# Patient Record
Sex: FEMALE | Race: WHITE
[De-identification: names, ages, dates, MRNs, and addresses within clinical notes are randomized per-mention and may not be internally consistent; named-entity substitution may affect disease eponyms.]

---

## 2018-09-14 ENCOUNTER — HOSPITAL ENCOUNTER (EMERGENCY)
Dept: HOSPITAL 8 - ED | Age: 28
Discharge: HOME | End: 2018-09-14
Payer: MEDICAID

## 2018-09-14 VITALS — WEIGHT: 133.6 LBS | HEIGHT: 60 IN | BODY MASS INDEX: 26.23 KG/M2

## 2018-09-14 VITALS — SYSTOLIC BLOOD PRESSURE: 120 MMHG | DIASTOLIC BLOOD PRESSURE: 84 MMHG

## 2018-09-14 DIAGNOSIS — R19.7: ICD-10-CM

## 2018-09-14 DIAGNOSIS — R11.2: Primary | ICD-10-CM

## 2018-09-14 PROCEDURE — 99283 EMERGENCY DEPT VISIT LOW MDM: CPT

## 2018-10-12 ENCOUNTER — HOSPITAL ENCOUNTER (EMERGENCY)
Dept: HOSPITAL 8 - ED | Age: 28
Discharge: HOME | End: 2018-10-12
Payer: MEDICAID

## 2018-10-12 VITALS — DIASTOLIC BLOOD PRESSURE: 79 MMHG | SYSTOLIC BLOOD PRESSURE: 124 MMHG

## 2018-10-12 VITALS — BODY MASS INDEX: 25.54 KG/M2 | HEIGHT: 60 IN | WEIGHT: 130.07 LBS

## 2018-10-12 DIAGNOSIS — R30.0: ICD-10-CM

## 2018-10-12 DIAGNOSIS — N39.0: Primary | ICD-10-CM

## 2018-10-12 LAB
CULTURE INDICATED?: YES
HCG UR SG: 1.02 (ref 1–1.03)
MICROSCOPIC: (no result)

## 2018-10-12 PROCEDURE — 81001 URINALYSIS AUTO W/SCOPE: CPT

## 2018-10-12 PROCEDURE — 87077 CULTURE AEROBIC IDENTIFY: CPT

## 2018-10-12 PROCEDURE — 87147 CULTURE TYPE IMMUNOLOGIC: CPT

## 2018-10-12 PROCEDURE — 99284 EMERGENCY DEPT VISIT MOD MDM: CPT

## 2018-10-12 PROCEDURE — 81025 URINE PREGNANCY TEST: CPT

## 2018-10-12 PROCEDURE — 87186 SC STD MICRODIL/AGAR DIL: CPT

## 2018-10-12 PROCEDURE — 87086 URINE CULTURE/COLONY COUNT: CPT

## 2018-10-15 ENCOUNTER — HOSPITAL ENCOUNTER (EMERGENCY)
Dept: HOSPITAL 8 - ED | Age: 28
Discharge: HOME | End: 2018-10-15
Payer: MEDICAID

## 2018-10-15 VITALS — DIASTOLIC BLOOD PRESSURE: 65 MMHG | SYSTOLIC BLOOD PRESSURE: 116 MMHG

## 2018-10-15 VITALS — HEIGHT: 60 IN | WEIGHT: 129.63 LBS | BODY MASS INDEX: 25.45 KG/M2

## 2018-10-15 DIAGNOSIS — N13.2: Primary | ICD-10-CM

## 2018-10-15 DIAGNOSIS — F17.200: ICD-10-CM

## 2018-10-15 LAB
ALBUMIN SERPL-MCNC: 3.6 G/DL (ref 3.4–5)
ALP SERPL-CCNC: 95 U/L (ref 45–117)
ALT SERPL-CCNC: 22 U/L (ref 12–78)
ANION GAP SERPL CALC-SCNC: 9 MMOL/L (ref 5–15)
BASOPHILS # BLD AUTO: 0.08 X10^3/UL (ref 0–0.1)
BASOPHILS NFR BLD AUTO: 1 % (ref 0–1)
BILIRUB SERPL-MCNC: 0.2 MG/DL (ref 0.2–1)
CALCIUM SERPL-MCNC: 8.6 MG/DL (ref 8.5–10.1)
CHLORIDE SERPL-SCNC: 107 MMOL/L (ref 98–107)
CREAT SERPL-MCNC: 0.8 MG/DL (ref 0.55–1.02)
CULTURE INDICATED?: YES
EOSINOPHIL # BLD AUTO: 0.24 X10^3/UL (ref 0–0.4)
EOSINOPHIL NFR BLD AUTO: 2 % (ref 1–7)
ERYTHROCYTE [DISTWIDTH] IN BLOOD BY AUTOMATED COUNT: 14 % (ref 9.6–15.2)
LYMPHOCYTES # BLD AUTO: 2.67 X10^3/UL (ref 1–3.4)
LYMPHOCYTES NFR BLD AUTO: 24 % (ref 22–44)
MCH RBC QN AUTO: 29.8 PG (ref 27–34.8)
MCHC RBC AUTO-ENTMCNC: 34.1 G/DL (ref 32.4–35.8)
MCV RBC AUTO: 87.3 FL (ref 80–100)
MD: NO
MICROSCOPIC: (no result)
MONOCYTES # BLD AUTO: 1.03 X10^3/UL (ref 0.2–0.8)
MONOCYTES NFR BLD AUTO: 9 % (ref 2–9)
NEUTROPHILS # BLD AUTO: 7.33 X10^3/UL (ref 1.8–6.8)
NEUTROPHILS NFR BLD AUTO: 65 % (ref 42–75)
PLATELET # BLD AUTO: 498 X10^3/UL (ref 130–400)
PMV BLD AUTO: 7.3 FL (ref 7.4–10.4)
PROT SERPL-MCNC: 7.8 G/DL (ref 6.4–8.2)
RBC # BLD AUTO: 4.5 X10^6/UL (ref 3.82–5.3)

## 2018-10-15 PROCEDURE — 87086 URINE CULTURE/COLONY COUNT: CPT

## 2018-10-15 PROCEDURE — 99285 EMERGENCY DEPT VISIT HI MDM: CPT

## 2018-10-15 PROCEDURE — 36415 COLL VENOUS BLD VENIPUNCTURE: CPT

## 2018-10-15 PROCEDURE — 85025 COMPLETE CBC W/AUTO DIFF WBC: CPT

## 2018-10-15 PROCEDURE — 87077 CULTURE AEROBIC IDENTIFY: CPT

## 2018-10-15 PROCEDURE — 96372 THER/PROPH/DIAG INJ SC/IM: CPT

## 2018-10-15 PROCEDURE — 81001 URINALYSIS AUTO W/SCOPE: CPT

## 2018-10-15 PROCEDURE — 80053 COMPREHEN METABOLIC PANEL: CPT

## 2018-10-15 PROCEDURE — 74176 CT ABD & PELVIS W/O CONTRAST: CPT

## 2018-10-28 ENCOUNTER — HOSPITAL ENCOUNTER (EMERGENCY)
Dept: HOSPITAL 8 - ED | Age: 28
Discharge: HOME | End: 2018-10-28
Payer: MEDICAID

## 2018-10-28 VITALS — BODY MASS INDEX: 25.75 KG/M2 | HEIGHT: 60 IN | WEIGHT: 131.18 LBS

## 2018-10-28 VITALS — DIASTOLIC BLOOD PRESSURE: 78 MMHG | SYSTOLIC BLOOD PRESSURE: 136 MMHG

## 2018-10-28 DIAGNOSIS — N10: Primary | ICD-10-CM

## 2018-10-28 DIAGNOSIS — F17.200: ICD-10-CM

## 2018-10-28 LAB
ALBUMIN SERPL-MCNC: 3.6 G/DL (ref 3.4–5)
ANION GAP SERPL CALC-SCNC: 6 MMOL/L (ref 5–15)
BASOPHILS # BLD AUTO: 0.03 X10^3/UL (ref 0–0.1)
BASOPHILS NFR BLD AUTO: 0 % (ref 0–1)
CALCIUM SERPL-MCNC: 8.5 MG/DL (ref 8.5–10.1)
CHLORIDE SERPL-SCNC: 98 MMOL/L (ref 98–107)
CREAT SERPL-MCNC: 0.77 MG/DL (ref 0.55–1.02)
CULTURE INDICATED?: YES
EOSINOPHIL # BLD AUTO: 0.05 X10^3/UL (ref 0–0.4)
EOSINOPHIL NFR BLD AUTO: 1 % (ref 1–7)
ERYTHROCYTE [DISTWIDTH] IN BLOOD BY AUTOMATED COUNT: 14.4 % (ref 9.6–15.2)
HCG UR SG: 1.01 (ref 1–1.03)
LYMPHOCYTES # BLD AUTO: 1.11 X10^3/UL (ref 1–3.4)
LYMPHOCYTES NFR BLD AUTO: 11 % (ref 22–44)
MCH RBC QN AUTO: 29.5 PG (ref 27–34.8)
MCHC RBC AUTO-ENTMCNC: 33.3 G/DL (ref 32.4–35.8)
MCV RBC AUTO: 88.5 FL (ref 80–100)
MD: NO
MICROSCOPIC: (no result)
MONOCYTES # BLD AUTO: 0.68 X10^3/UL (ref 0.2–0.8)
MONOCYTES NFR BLD AUTO: 7 % (ref 2–9)
NEUTROPHILS # BLD AUTO: 8.67 X10^3/UL (ref 1.8–6.8)
NEUTROPHILS NFR BLD AUTO: 82 % (ref 42–75)
PLATELET # BLD AUTO: 411 X10^3/UL (ref 130–400)
PMV BLD AUTO: 7.1 FL (ref 7.4–10.4)
RBC # BLD AUTO: 4.52 X10^6/UL (ref 3.82–5.3)

## 2018-10-28 PROCEDURE — 93005 ELECTROCARDIOGRAM TRACING: CPT

## 2018-10-28 PROCEDURE — 81025 URINE PREGNANCY TEST: CPT

## 2018-10-28 PROCEDURE — 87077 CULTURE AEROBIC IDENTIFY: CPT

## 2018-10-28 PROCEDURE — 87040 BLOOD CULTURE FOR BACTERIA: CPT

## 2018-10-28 PROCEDURE — 85025 COMPLETE CBC W/AUTO DIFF WBC: CPT

## 2018-10-28 PROCEDURE — 82040 ASSAY OF SERUM ALBUMIN: CPT

## 2018-10-28 PROCEDURE — 99285 EMERGENCY DEPT VISIT HI MDM: CPT

## 2018-10-28 PROCEDURE — 36415 COLL VENOUS BLD VENIPUNCTURE: CPT

## 2018-10-28 PROCEDURE — 80048 BASIC METABOLIC PNL TOTAL CA: CPT

## 2018-10-28 PROCEDURE — 96365 THER/PROPH/DIAG IV INF INIT: CPT

## 2018-10-28 PROCEDURE — 83605 ASSAY OF LACTIC ACID: CPT

## 2018-10-28 PROCEDURE — 87186 SC STD MICRODIL/AGAR DIL: CPT

## 2018-10-28 PROCEDURE — 76770 US EXAM ABDO BACK WALL COMP: CPT

## 2018-10-28 PROCEDURE — 87086 URINE CULTURE/COLONY COUNT: CPT

## 2018-10-28 PROCEDURE — 81001 URINALYSIS AUTO W/SCOPE: CPT

## 2018-10-28 PROCEDURE — 96375 TX/PRO/DX INJ NEW DRUG ADDON: CPT

## 2018-10-31 ENCOUNTER — APPOINTMENT (OUTPATIENT)
Dept: RADIOLOGY | Facility: MEDICAL CENTER | Age: 28
End: 2018-10-31
Attending: EMERGENCY MEDICINE

## 2018-10-31 ENCOUNTER — HOSPITAL ENCOUNTER (EMERGENCY)
Facility: MEDICAL CENTER | Age: 28
End: 2018-10-31
Attending: EMERGENCY MEDICINE

## 2018-10-31 VITALS
DIASTOLIC BLOOD PRESSURE: 78 MMHG | SYSTOLIC BLOOD PRESSURE: 118 MMHG | HEART RATE: 103 BPM | WEIGHT: 132.06 LBS | HEIGHT: 60 IN | RESPIRATION RATE: 18 BRPM | OXYGEN SATURATION: 98 % | TEMPERATURE: 96.9 F | BODY MASS INDEX: 25.93 KG/M2

## 2018-10-31 DIAGNOSIS — F41.8 SITUATIONAL ANXIETY: ICD-10-CM

## 2018-10-31 DIAGNOSIS — N12 PYELONEPHRITIS: ICD-10-CM

## 2018-10-31 DIAGNOSIS — F15.10 METHAMPHETAMINE ABUSE (HCC): ICD-10-CM

## 2018-10-31 LAB
AMPHET UR QL SCN: POSITIVE
ANION GAP SERPL CALC-SCNC: 9 MMOL/L (ref 0–11.9)
APPEARANCE UR: ABNORMAL
BACTERIA #/AREA URNS HPF: ABNORMAL /HPF
BARBITURATES UR QL SCN: NEGATIVE
BASOPHILS # BLD AUTO: 0.5 % (ref 0–1.8)
BASOPHILS # BLD: 0.05 K/UL (ref 0–0.12)
BENZODIAZ UR QL SCN: NEGATIVE
BILIRUB UR QL STRIP.AUTO: NEGATIVE
BUN SERPL-MCNC: 7 MG/DL (ref 8–22)
BZE UR QL SCN: NEGATIVE
CALCIUM SERPL-MCNC: 9.8 MG/DL (ref 8.5–10.5)
CANNABINOIDS UR QL SCN: POSITIVE
CHLORIDE SERPL-SCNC: 101 MMOL/L (ref 96–112)
CO2 SERPL-SCNC: 25 MMOL/L (ref 20–33)
COLOR UR: YELLOW
CREAT SERPL-MCNC: 0.59 MG/DL (ref 0.5–1.4)
EKG IMPRESSION: NORMAL
EOSINOPHIL # BLD AUTO: 0.17 K/UL (ref 0–0.51)
EOSINOPHIL NFR BLD: 1.6 % (ref 0–6.9)
EPI CELLS #/AREA URNS HPF: ABNORMAL /HPF
ERYTHROCYTE [DISTWIDTH] IN BLOOD BY AUTOMATED COUNT: 43.4 FL (ref 35.9–50)
GLUCOSE SERPL-MCNC: 98 MG/DL (ref 65–99)
GLUCOSE UR STRIP.AUTO-MCNC: NEGATIVE MG/DL
HCG UR QL: NEGATIVE
HCT VFR BLD AUTO: 36.4 % (ref 37–47)
HGB BLD-MCNC: 12.4 G/DL (ref 12–16)
HYALINE CASTS #/AREA URNS LPF: ABNORMAL /LPF
IMM GRANULOCYTES # BLD AUTO: 0.04 K/UL (ref 0–0.11)
IMM GRANULOCYTES NFR BLD AUTO: 0.4 % (ref 0–0.9)
KETONES UR STRIP.AUTO-MCNC: NEGATIVE MG/DL
LEUKOCYTE ESTERASE UR QL STRIP.AUTO: ABNORMAL
LYMPHOCYTES # BLD AUTO: 2.78 K/UL (ref 1–4.8)
LYMPHOCYTES NFR BLD: 26.2 % (ref 22–41)
MCH RBC QN AUTO: 29.4 PG (ref 27–33)
MCHC RBC AUTO-ENTMCNC: 34.1 G/DL (ref 33.6–35)
MCV RBC AUTO: 86.3 FL (ref 81.4–97.8)
METHADONE UR QL SCN: NEGATIVE
MICRO URNS: ABNORMAL
MONOCYTES # BLD AUTO: 1.34 K/UL (ref 0–0.85)
MONOCYTES NFR BLD AUTO: 12.6 % (ref 0–13.4)
NEUTROPHILS # BLD AUTO: 6.23 K/UL (ref 2–7.15)
NEUTROPHILS NFR BLD: 58.7 % (ref 44–72)
NITRITE UR QL STRIP.AUTO: NEGATIVE
NRBC # BLD AUTO: 0 K/UL
NRBC BLD-RTO: 0 /100 WBC
OPIATES UR QL SCN: NEGATIVE
OXYCODONE UR QL SCN: NEGATIVE
PCP UR QL SCN: NEGATIVE
PH UR STRIP.AUTO: 6 [PH]
PLATELET # BLD AUTO: 389 K/UL (ref 164–446)
PMV BLD AUTO: 9.2 FL (ref 9–12.9)
POTASSIUM SERPL-SCNC: 3.5 MMOL/L (ref 3.6–5.5)
PROPOXYPH UR QL SCN: NEGATIVE
PROT UR QL STRIP: NEGATIVE MG/DL
RBC # BLD AUTO: 4.22 M/UL (ref 4.2–5.4)
RBC # URNS HPF: ABNORMAL /HPF
RBC UR QL AUTO: ABNORMAL
SODIUM SERPL-SCNC: 135 MMOL/L (ref 135–145)
SP GR UR REFRACTOMETRY: 1.02
SP GR UR STRIP.AUTO: 1.02
UROBILINOGEN UR STRIP.AUTO-MCNC: 1 MG/DL
WBC # BLD AUTO: 10.6 K/UL (ref 4.8–10.8)
WBC #/AREA URNS HPF: ABNORMAL /HPF

## 2018-10-31 PROCEDURE — 700105 HCHG RX REV CODE 258: Performed by: EMERGENCY MEDICINE

## 2018-10-31 PROCEDURE — 96375 TX/PRO/DX INJ NEW DRUG ADDON: CPT

## 2018-10-31 PROCEDURE — 99284 EMERGENCY DEPT VISIT MOD MDM: CPT

## 2018-10-31 PROCEDURE — 36415 COLL VENOUS BLD VENIPUNCTURE: CPT

## 2018-10-31 PROCEDURE — 700102 HCHG RX REV CODE 250 W/ 637 OVERRIDE(OP): Performed by: EMERGENCY MEDICINE

## 2018-10-31 PROCEDURE — A9270 NON-COVERED ITEM OR SERVICE: HCPCS | Performed by: EMERGENCY MEDICINE

## 2018-10-31 PROCEDURE — 76775 US EXAM ABDO BACK WALL LIM: CPT

## 2018-10-31 PROCEDURE — 85025 COMPLETE CBC W/AUTO DIFF WBC: CPT

## 2018-10-31 PROCEDURE — 81001 URINALYSIS AUTO W/SCOPE: CPT

## 2018-10-31 PROCEDURE — 80048 BASIC METABOLIC PNL TOTAL CA: CPT

## 2018-10-31 PROCEDURE — 93005 ELECTROCARDIOGRAM TRACING: CPT

## 2018-10-31 PROCEDURE — 700111 HCHG RX REV CODE 636 W/ 250 OVERRIDE (IP): Performed by: EMERGENCY MEDICINE

## 2018-10-31 PROCEDURE — 80307 DRUG TEST PRSMV CHEM ANLYZR: CPT

## 2018-10-31 PROCEDURE — 93005 ELECTROCARDIOGRAM TRACING: CPT | Performed by: EMERGENCY MEDICINE

## 2018-10-31 PROCEDURE — 81025 URINE PREGNANCY TEST: CPT

## 2018-10-31 PROCEDURE — 96365 THER/PROPH/DIAG IV INF INIT: CPT

## 2018-10-31 RX ORDER — SULFAMETHOXAZOLE AND TRIMETHOPRIM 800; 160 MG/1; MG/1
1 TABLET ORAL ONCE
Status: COMPLETED | OUTPATIENT
Start: 2018-10-31 | End: 2018-10-31

## 2018-10-31 RX ORDER — SODIUM CHLORIDE 9 MG/ML
1000 INJECTION, SOLUTION INTRAVENOUS ONCE
Status: COMPLETED | OUTPATIENT
Start: 2018-10-31 | End: 2018-10-31

## 2018-10-31 RX ORDER — KETOROLAC TROMETHAMINE 30 MG/ML
10 INJECTION, SOLUTION INTRAMUSCULAR; INTRAVENOUS ONCE
Status: COMPLETED | OUTPATIENT
Start: 2018-10-31 | End: 2018-10-31

## 2018-10-31 RX ORDER — SULFAMETHOXAZOLE AND TRIMETHOPRIM 800; 160 MG/1; MG/1
1 TABLET ORAL 2 TIMES DAILY
Qty: 20 TAB | Refills: 0 | Status: SHIPPED | OUTPATIENT
Start: 2018-10-31 | End: 2018-11-10

## 2018-10-31 RX ADMIN — SULFAMETHOXAZOLE AND TRIMETHOPRIM 1 TABLET: 800; 160 TABLET ORAL at 04:15

## 2018-10-31 RX ADMIN — KETOROLAC TROMETHAMINE 9.99 MG: 30 INJECTION, SOLUTION INTRAMUSCULAR at 02:52

## 2018-10-31 RX ADMIN — SODIUM CHLORIDE 1000 ML: 9 INJECTION, SOLUTION INTRAVENOUS at 02:52

## 2018-10-31 RX ADMIN — FENTANYL CITRATE 50 MCG: 50 INJECTION, SOLUTION INTRAMUSCULAR; INTRAVENOUS at 02:52

## 2018-10-31 RX ADMIN — CEFTRIAXONE SODIUM 1 G: 1 INJECTION, POWDER, FOR SOLUTION INTRAMUSCULAR; INTRAVENOUS at 04:15

## 2018-10-31 ASSESSMENT — PAIN SCALES - GENERAL
PAINLEVEL_OUTOF10: 8
PAINLEVEL_OUTOF10: 3
PAINLEVEL_OUTOF10: 7

## 2018-10-31 NOTE — ED TRIAGE NOTES
"Zita Mejia  28 y.o.  Chief Complaint   Patient presents with   • UTI     seen at [facility] for the same yesterday, given antibiotics/fluids and discharged   • Flank Pain     RIGHT     Ambulatory to triage with steady gait for above.    States that in the past month she has been treated for a UTI, kidney stone, and kidney infection, all in that order and all at [facility]. Each time patient has taken medication as prescribed at home. States that she took antibiotics yesterday and today as prescribed by [facility] but still is not feeling better. \"I keep going there and my symptoms aren't going away so I figured I would come here instead and see if you guys could help me.\"    EKG called for again in triage per protocol.    Specimen container provided to patient and instructed on clean catch urine sample - verbalized understanding.    Triage process explained to patient, apologized for wait time, and returned to lobby.  "

## 2018-10-31 NOTE — ED NOTES
Assist RN: IV access placed and blood drawn per orders and sent to lab. Patient tolerated fairly well with minimal complaints of pain/discomfort.

## 2018-10-31 NOTE — ED PROVIDER NOTES
"ED Provider Note    Scribed for Jas Small M.D. by Jas Small. 10/31/2018,  1:22 AM.    CHIEF COMPLAINT  Chief Complaint   Patient presents with   • UTI     seen at [facility] for the same yesterday, given antibiotics/fluids and discharged   • Flank Pain     RIGHT       HPI  Zita Mejia is a pleasant but very anxious 28 y.o. female who presents to the Emergency Department for right-sided flank pain, and dysuria.  She reports that in the past month, she was treated for a simple urinary tract infection, then a kidney stone, and then a kidney infection, with all of these diagnoses being made at [facility].  She was seen yesterday for right-sided flank pain and urinary tract infection.  She says that she started the antibiotics that she was given yesterday, took them today, but is continuing to have symptoms.  She states \"I keep going there and my symptoms aren't going away so I figured I would come here instead and see if you guys could help me.\"  She reports subjective fevers and chills, relatively constant right flank pain, no nausea or vomiting, no chest pain or shortness of breath.  She thinks her urine is dark looking, but not bloody.  She says that at [facility] yesterday they had a hard time getting her heart rate down.  She says that she received 2 L of IV fluid there.  She does not have a known history of kidney stones, or frequent infections.  She says she takes no medications on a normal day.  She is a pack-a-day smoker.  She says that the kidney stone was diagnosed with a CT scan.  She says that an ultrasound was repeated yesterday but does not know the results.     An EKG was performed in triage because of a presenting heart rate of 130 bpm.      REVIEW OF SYSTEMS  See HPI for further details. All other systems are negative.     PAST MEDICAL HISTORY   has a past medical history of Kidney stone; Marijuana use; Pyelonephritis; Smoker; and UTI (urinary tract infection).    SOCIAL " HISTORY  Social History     Social History Main Topics   • Smoking status: Current Every Day Smoker     Packs/day: 1.00     Types: Cigarettes   • Smokeless tobacco: Never Used      Comment: 15    • Alcohol use 0.5 oz/week     1 Cans of beer per week   • Drug use: Yes     Types: Marijuana, Inhaled      Comment: marijuana daily   • Sexual activity: Not on file     History   Drug Use   • Types: Marijuana, Inhaled     Comment: marijuana daily       SURGICAL HISTORY  patient denies any surgical history    CURRENT MEDICATIONS  Home Medications     Reviewed by Millie Arellano R.N. (Registered Nurse) on 10/31/18 at 0049  Med List Status: Unable to Obtain   Medication Last Dose Status        Patient Pipe Taking any Medications                       ALLERGIES  Allergies   Allergen Reactions   • Azithromycin    • Ibuprofen        PHYSICAL EXAM  VITAL SIGNS: /78   Pulse (!) 103   Temp 36.1 °C (96.9 °F)   Resp 18   Ht 1.524 m (5')   Wt 59.9 kg (132 lb 0.9 oz)   SpO2 98%   BMI 25.79 kg/m²    Pulse ox interpretation: I interpret this pulse ox as normal.  Constitutional: Alert in no apparent distress.  Very anxious, rapid, pressured speech.  HENT: No signs of trauma, Bilateral external ears normal, Nose normal.   Eyes: Conjunctiva normal, Non-icteric.   Neck: Normal range of motion, Supple, No stridor.   Lymphatic: No lymphadenopathy noted.   Cardiovascular: Regular rate and rhythm, no murmurs.   Thorax & Lungs: Normal breath sounds, No respiratory distress, No wheezing, No chest tenderness.   Abdomen: Bowel sounds normal, Soft, No tenderness, No masses, No pulsatile masses. No peritoneal signs.  Skin: Warm, Dry, No erythema, No rash.   Back: Moderate right-sided CVA tenderness  Extremities: Intact distal pulses, No edema, No cyanosis.  Musculoskeletal: Good range of motion in all major joints. No or major deformities noted.   Neurologic: Alert , Normal motor function, Normal sensory function, No focal deficits noted.    Psychiatric: Affect anxious, judgment normal, Mood normal.     DIAGNOSTIC STUDIES / PROCEDURES    EKG  Interpreted by me    Rhythm:  Sinus tachycardia  Rate: 122  Axis: normal  Intervals: normal  Ectopy: none  Conduction: normal  ST Segments: no acute change  T Waves: no acute change  Q Waves: none  No Old EKG.    LABS  Labs Reviewed   CBC WITH DIFFERENTIAL - Abnormal; Notable for the following:        Result Value    Hematocrit 36.4 (*)     Monos (Absolute) 1.34 (*)     All other components within normal limits   BASIC METABOLIC PANEL - Abnormal; Notable for the following:     Potassium 3.5 (*)     Bun 7 (*)     All other components within normal limits   URINALYSIS,CULTURE IF INDICATED - Abnormal; Notable for the following:     Character Cloudy (*)     Leukocyte Esterase Large (*)     Occult Blood Trace (*)     All other components within normal limits   URINE DRUG SCREEN - Abnormal; Notable for the following:     Amphetamines Urine Positive (*)     Cannabinoid Metab Positive (*)     All other components within normal limits   URINE MICROSCOPIC (W/UA) - Abnormal; Notable for the following:     WBC  (*)     RBC 5-10 (*)     Bacteria Moderate (*)     Epithelial Cells Many (*)     Hyaline Cast 3-5 (*)     All other components within normal limits   HCG QUALITATIVE UR   REFRACTOMETER SG   ESTIMATED GFR     All labs reviewed by me.    RADIOLOGY  US-RENAL   Final Result         1.  Normal renal ultrasound.        The radiologist's interpretation of all radiological studies have been reviewed by me.    COURSE & MEDICAL DECISION MAKING  Nursing notes, VS, PMSFHx reviewed in chart.     1:22 AM Patient seen and examined at bedside. Differential diagnosis includes but is not limited to pyelonephritis, renal colic, ureteral stone, hydronephrosis, dehydration, pregnancy, ectopic pregnancy, anxiety. Ordered for renal ultrasound, urinalysis, urine pregnancy test, urine drug screen, and screening blood tests to evaluate.  Patient will be treated with Toradol and fentanyl and a liter of IV fluid for her symptoms.     HYDRATION: Based on the patient's presentation of Tachycardia the patient was given IV fluids. IV Hydration was used becasue oral hydration was not as rapid as required. Upon recheck following hydration, the patient was improved, with a lower heart rate. .    This patient's heart rate returned nearly to normal with IV fluids.  Some of her tachycardia is likely secondary to methamphetamine abuse, as evidenced by her laboratory results, consistent with a very pressured speech and apparent anxiety.  Her laboratory tests were very reassuring.  There is no evidence of any systemic illness on her laboratory tests.  She does still have evidence of urinary tract infection and right CVA tenderness.  Fortunately, her ultrasound did not show any evidence of kidney stone or hydronephrosis.  She will be discharged in improved condition with a prescription for twice daily Bactrim, after a gram of i.v. ceftriaxone here.     The patient will return for new or worsening symptoms and is stable at the time of discharge.    The patient is referred to a primary physician for blood pressure management, diabetic screening, and for all other preventative health concerns.    DISPOSITION:  Patient will be discharged home in stable condition.    FOLLOW UP:  [facility]  [address] [address]  [phone]  Schedule an appointment as soon as possible for a visit   for primary care    [facility]  [address]  [place] [address]  [phone]    Schedule an appointment as soon as possible for a visit   for primary care    [facility]  [address] [place] [address] [address]  [facility] [address]  Schedule an appointment as soon as possible for a visit   for primary care      OUTPATIENT MEDICATIONS:  Discharge Medication List as of 10/31/2018  4:50 AM      START taking these medications    Details   sulfamethoxazole-trimethoprim (BACTRIM  DS) 800-160 MG tablet Take 1 Tab by mouth 2 times a day for 10 days., Disp-20 Tab, R-0, Print Rx Paper             FINAL IMPRESSION  1. Pyelonephritis Active   2. Methamphetamine abuse (HCC) Active   3. Situational anxiety Active

## 2018-10-31 NOTE — ED NOTES
Is currently being treated for kidney infections and is taking atb's as described.   Pt c/o of right flank pain and urinary pain.

## 2018-10-31 NOTE — ED NOTES
All lines and monitors discontinued. Discharge instructions given, questions answered.    ambulatroy out of ER, escorted by self.  Instructed not to drive after taking pain medication and pt verbalizes understanding.  Rx x bactrim given.

## 2018-10-31 NOTE — DISCHARGE INSTRUCTIONS
Your blood tests were very reassuring and your ultrasound was normal. We did not see kidney stones. Use the antibiotics we've prescribed, as directed. Use the clinic information we provided to establish a primary care doctor.

## 2019-02-24 ENCOUNTER — HOSPITAL ENCOUNTER (EMERGENCY)
Dept: HOSPITAL 8 - ED | Age: 29
Discharge: HOME | End: 2019-02-24
Payer: MEDICAID

## 2019-02-24 VITALS — HEIGHT: 60 IN | WEIGHT: 150.13 LBS | BODY MASS INDEX: 29.48 KG/M2

## 2019-02-24 VITALS — SYSTOLIC BLOOD PRESSURE: 128 MMHG | DIASTOLIC BLOOD PRESSURE: 80 MMHG

## 2019-02-24 DIAGNOSIS — H92.03: ICD-10-CM

## 2019-02-24 DIAGNOSIS — B34.9: Primary | ICD-10-CM

## 2019-02-24 DIAGNOSIS — F17.200: ICD-10-CM

## 2019-02-24 PROCEDURE — 99283 EMERGENCY DEPT VISIT LOW MDM: CPT

## 2019-02-24 PROCEDURE — 71046 X-RAY EXAM CHEST 2 VIEWS: CPT

## 2019-12-23 ENCOUNTER — HOSPITAL ENCOUNTER (EMERGENCY)
Dept: HOSPITAL 8 - ED | Age: 29
LOS: 1 days | Discharge: HOME | End: 2019-12-24
Payer: SELF-PAY

## 2019-12-23 VITALS — BODY MASS INDEX: 25.93 KG/M2 | WEIGHT: 132.06 LBS | HEIGHT: 60 IN

## 2019-12-23 DIAGNOSIS — Z86.19: ICD-10-CM

## 2019-12-23 DIAGNOSIS — A59.01: Primary | ICD-10-CM

## 2019-12-23 DIAGNOSIS — F17.200: ICD-10-CM

## 2019-12-23 DIAGNOSIS — Z87.440: ICD-10-CM

## 2019-12-23 PROCEDURE — 81025 URINE PREGNANCY TEST: CPT

## 2019-12-23 PROCEDURE — 96372 THER/PROPH/DIAG INJ SC/IM: CPT

## 2019-12-23 PROCEDURE — 87591 N.GONORRHOEAE DNA AMP PROB: CPT

## 2019-12-23 PROCEDURE — 87808 TRICHOMONAS ASSAY W/OPTIC: CPT

## 2019-12-23 PROCEDURE — 87491 CHLMYD TRACH DNA AMP PROBE: CPT

## 2019-12-23 PROCEDURE — 87210 SMEAR WET MOUNT SALINE/INK: CPT

## 2019-12-23 PROCEDURE — 87086 URINE CULTURE/COLONY COUNT: CPT

## 2019-12-23 PROCEDURE — 99283 EMERGENCY DEPT VISIT LOW MDM: CPT

## 2019-12-23 PROCEDURE — 81001 URINALYSIS AUTO W/SCOPE: CPT

## 2019-12-24 VITALS — SYSTOLIC BLOOD PRESSURE: 125 MMHG | DIASTOLIC BLOOD PRESSURE: 74 MMHG

## 2019-12-24 LAB
CLUE CELLS: (no result)
CULTURE INDICATED?: YES
HCG UR SG: 1.02 (ref 1–1.03)
MICROSCOPIC: (no result)
T VAGINALIS RRNA GENITAL QL PROBE: PRESENT
WET PREP WBCS: (no result)

## 2020-06-05 ENCOUNTER — HOSPITAL ENCOUNTER (EMERGENCY)
Dept: HOSPITAL 8 - ED | Age: 30
Discharge: HOME | End: 2020-06-05
Payer: MEDICAID

## 2020-06-05 VITALS — DIASTOLIC BLOOD PRESSURE: 88 MMHG | SYSTOLIC BLOOD PRESSURE: 147 MMHG

## 2020-06-05 VITALS — WEIGHT: 139.33 LBS | HEIGHT: 60 IN | BODY MASS INDEX: 27.35 KG/M2

## 2020-06-05 DIAGNOSIS — S02.2XXA: Primary | ICD-10-CM
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
## 2020-06-05 DIAGNOSIS — R04.0: ICD-10-CM
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
## 2020-06-05 PROCEDURE — 30901 CONTROL OF NOSEBLEED: CPT

## 2020-06-05 PROCEDURE — 99284 EMERGENCY DEPT VISIT MOD MDM: CPT

## 2020-06-05 NOTE — NUR
Mason CALVILLO at bedside to recheck pt. Epistaxis appears controlled at this time. 
POC discussed with pt. Pt to be monitored in ER to ensure bleeding does not 
restart.

## 2020-06-05 NOTE — NUR
Pt medicated per MAR. After going over pt's dc instructions pt's R anthony began 
dripping blood. PA updated. PA at bedside to perform chemical cautery.

## 2020-06-05 NOTE — NUR
Pt resting in exam chair, looking at her phone, NADN. Gauze in R nare and nasal 
clamp in place. POC discussed. Pt to be rechecked by PA. Pt denies other needs.

## 2021-01-12 ENCOUNTER — HOSPITAL ENCOUNTER (EMERGENCY)
Dept: HOSPITAL 8 - ED | Age: 31
End: 2021-01-12
Payer: SELF-PAY

## 2021-01-12 VITALS — HEIGHT: 60 IN | WEIGHT: 132.5 LBS | BODY MASS INDEX: 26.01 KG/M2

## 2021-01-12 VITALS — SYSTOLIC BLOOD PRESSURE: 137 MMHG | DIASTOLIC BLOOD PRESSURE: 81 MMHG

## 2021-01-12 DIAGNOSIS — F15.10: ICD-10-CM

## 2021-01-12 DIAGNOSIS — F17.200: ICD-10-CM

## 2021-01-12 DIAGNOSIS — L03.113: Primary | ICD-10-CM

## 2021-01-12 LAB
ALBUMIN SERPL-MCNC: 3.7 G/DL (ref 3.4–5)
ANION GAP SERPL CALC-SCNC: 8 MMOL/L (ref 5–15)
BASOPHILS # BLD AUTO: 0.1 X10^3/UL (ref 0–0.1)
BASOPHILS NFR BLD AUTO: 1 % (ref 0–1)
CALCIUM SERPL-MCNC: 9.1 MG/DL (ref 8.5–10.1)
CHLORIDE SERPL-SCNC: 108 MMOL/L (ref 98–107)
CREAT SERPL-MCNC: 0.71 MG/DL (ref 0.55–1.02)
EOSINOPHIL # BLD AUTO: 0.2 X10^3/UL (ref 0–0.4)
EOSINOPHIL NFR BLD AUTO: 2 % (ref 1–7)
ERYTHROCYTE [DISTWIDTH] IN BLOOD BY AUTOMATED COUNT: 13.1 % (ref 9.6–15.2)
LYMPHOCYTES # BLD AUTO: 3.2 X10^3/UL (ref 1–3.4)
LYMPHOCYTES NFR BLD AUTO: 35 % (ref 22–44)
MCH RBC QN AUTO: 30.4 PG (ref 27–34.8)
MCHC RBC AUTO-ENTMCNC: 33.9 G/DL (ref 32.4–35.8)
MD: NO
MONOCYTES # BLD AUTO: 0.8 X10^3/UL (ref 0.2–0.8)
MONOCYTES NFR BLD AUTO: 8 % (ref 2–9)
NEUTROPHILS # BLD AUTO: 4.8 X10^3/UL (ref 1.8–6.8)
NEUTROPHILS NFR BLD AUTO: 53 % (ref 42–75)
PLATELET # BLD AUTO: 485 X10^3/UL (ref 130–400)
PMV BLD AUTO: 6.9 FL (ref 7.4–10.4)
RBC # BLD AUTO: 4.5 X10^6/UL (ref 3.82–5.3)

## 2021-01-12 PROCEDURE — 99284 EMERGENCY DEPT VISIT MOD MDM: CPT

## 2021-01-12 PROCEDURE — 85025 COMPLETE CBC W/AUTO DIFF WBC: CPT

## 2021-01-12 PROCEDURE — 36415 COLL VENOUS BLD VENIPUNCTURE: CPT

## 2021-01-12 PROCEDURE — 96375 TX/PRO/DX INJ NEW DRUG ADDON: CPT

## 2021-01-12 PROCEDURE — 80048 BASIC METABOLIC PNL TOTAL CA: CPT

## 2021-01-12 PROCEDURE — 96376 TX/PRO/DX INJ SAME DRUG ADON: CPT

## 2021-01-12 PROCEDURE — 96365 THER/PROPH/DIAG IV INF INIT: CPT

## 2021-01-12 PROCEDURE — 87040 BLOOD CULTURE FOR BACTERIA: CPT

## 2021-01-12 PROCEDURE — 82040 ASSAY OF SERUM ALBUMIN: CPT

## 2021-01-12 RX ADMIN — MORPHINE SULFATE PRN MG: 4 INJECTION INTRAVENOUS at 18:19

## 2021-01-12 RX ADMIN — MORPHINE SULFATE PRN MG: 4 INJECTION INTRAVENOUS at 17:37

## 2021-10-16 ENCOUNTER — APPOINTMENT (OUTPATIENT)
Dept: RADIOLOGY | Facility: MEDICAL CENTER | Age: 31
End: 2021-10-16
Attending: EMERGENCY MEDICINE
Payer: COMMERCIAL

## 2021-10-16 ENCOUNTER — HOSPITAL ENCOUNTER (EMERGENCY)
Facility: MEDICAL CENTER | Age: 31
End: 2021-10-17
Attending: EMERGENCY MEDICINE
Payer: COMMERCIAL

## 2021-10-16 DIAGNOSIS — O20.0 THREATENED MISCARRIAGE IN EARLY PREGNANCY: ICD-10-CM

## 2021-10-16 LAB
ALBUMIN SERPL BCP-MCNC: 4.4 G/DL (ref 3.2–4.9)
ALBUMIN/GLOB SERPL: 1.7 G/DL
ALP SERPL-CCNC: 75 U/L (ref 30–99)
ALT SERPL-CCNC: 58 U/L (ref 2–50)
ANION GAP SERPL CALC-SCNC: 11 MMOL/L (ref 7–16)
APPEARANCE UR: CLEAR
AST SERPL-CCNC: 23 U/L (ref 12–45)
B-HCG SERPL-ACNC: ABNORMAL MIU/ML (ref 0–5)
BASOPHILS # BLD AUTO: 0.4 % (ref 0–1.8)
BASOPHILS # BLD: 0.06 K/UL (ref 0–0.12)
BILIRUB SERPL-MCNC: <0.2 MG/DL (ref 0.1–1.5)
BILIRUB UR QL STRIP.AUTO: NEGATIVE
BUN SERPL-MCNC: 11 MG/DL (ref 8–22)
CALCIUM SERPL-MCNC: 9.3 MG/DL (ref 8.5–10.5)
CHLORIDE SERPL-SCNC: 105 MMOL/L (ref 96–112)
CO2 SERPL-SCNC: 22 MMOL/L (ref 20–33)
COLOR UR: YELLOW
CREAT SERPL-MCNC: 0.51 MG/DL (ref 0.5–1.4)
EOSINOPHIL # BLD AUTO: 0.3 K/UL (ref 0–0.51)
EOSINOPHIL NFR BLD: 2.1 % (ref 0–6.9)
ERYTHROCYTE [DISTWIDTH] IN BLOOD BY AUTOMATED COUNT: 44 FL (ref 35.9–50)
GLOBULIN SER CALC-MCNC: 2.6 G/DL (ref 1.9–3.5)
GLUCOSE SERPL-MCNC: 65 MG/DL (ref 65–99)
GLUCOSE UR STRIP.AUTO-MCNC: NEGATIVE MG/DL
HCT VFR BLD AUTO: 38.4 % (ref 37–47)
HGB BLD-MCNC: 12.9 G/DL (ref 12–16)
IMM GRANULOCYTES # BLD AUTO: 0.09 K/UL (ref 0–0.11)
IMM GRANULOCYTES NFR BLD AUTO: 0.6 % (ref 0–0.9)
KETONES UR STRIP.AUTO-MCNC: NEGATIVE MG/DL
LEUKOCYTE ESTERASE UR QL STRIP.AUTO: NEGATIVE
LIPASE SERPL-CCNC: 46 U/L (ref 11–82)
LYMPHOCYTES # BLD AUTO: 3.73 K/UL (ref 1–4.8)
LYMPHOCYTES NFR BLD: 25.7 % (ref 22–41)
MCH RBC QN AUTO: 30 PG (ref 27–33)
MCHC RBC AUTO-ENTMCNC: 33.6 G/DL (ref 33.6–35)
MCV RBC AUTO: 89.3 FL (ref 81.4–97.8)
MICRO URNS: NORMAL
MONOCYTES # BLD AUTO: 1.14 K/UL (ref 0–0.85)
MONOCYTES NFR BLD AUTO: 7.9 % (ref 0–13.4)
NEUTROPHILS # BLD AUTO: 9.18 K/UL (ref 2–7.15)
NEUTROPHILS NFR BLD: 63.3 % (ref 44–72)
NITRITE UR QL STRIP.AUTO: NEGATIVE
NRBC # BLD AUTO: 0 K/UL
NRBC BLD-RTO: 0 /100 WBC
NUMBER OF RH DOSES IND 8505RD: NORMAL
PH UR STRIP.AUTO: 6 [PH] (ref 5–8)
PLATELET # BLD AUTO: 376 K/UL (ref 164–446)
PMV BLD AUTO: 9.5 FL (ref 9–12.9)
POTASSIUM SERPL-SCNC: 3.9 MMOL/L (ref 3.6–5.5)
PROT SERPL-MCNC: 7 G/DL (ref 6–8.2)
PROT UR QL STRIP: NEGATIVE MG/DL
RBC # BLD AUTO: 4.3 M/UL (ref 4.2–5.4)
RBC UR QL AUTO: NEGATIVE
RH BLD: NORMAL
SODIUM SERPL-SCNC: 138 MMOL/L (ref 135–145)
SP GR UR STRIP.AUTO: 1
UROBILINOGEN UR STRIP.AUTO-MCNC: 0.2 MG/DL
WBC # BLD AUTO: 14.5 K/UL (ref 4.8–10.8)

## 2021-10-16 PROCEDURE — 99284 EMERGENCY DEPT VISIT MOD MDM: CPT

## 2021-10-16 PROCEDURE — 85025 COMPLETE CBC W/AUTO DIFF WBC: CPT

## 2021-10-16 PROCEDURE — 83690 ASSAY OF LIPASE: CPT

## 2021-10-16 PROCEDURE — 84702 CHORIONIC GONADOTROPIN TEST: CPT

## 2021-10-16 PROCEDURE — 80053 COMPREHEN METABOLIC PANEL: CPT

## 2021-10-16 PROCEDURE — 81003 URINALYSIS AUTO W/O SCOPE: CPT

## 2021-10-16 PROCEDURE — 86901 BLOOD TYPING SEROLOGIC RH(D): CPT

## 2021-10-17 VITALS
HEIGHT: 60 IN | DIASTOLIC BLOOD PRESSURE: 69 MMHG | TEMPERATURE: 97.9 F | SYSTOLIC BLOOD PRESSURE: 114 MMHG | HEART RATE: 62 BPM | WEIGHT: 143.96 LBS | OXYGEN SATURATION: 98 % | RESPIRATION RATE: 20 BRPM | BODY MASS INDEX: 28.26 KG/M2

## 2021-10-17 PROCEDURE — 76801 OB US < 14 WKS SINGLE FETUS: CPT

## 2021-10-17 NOTE — ED PROVIDER NOTES
"ED Provider Note    ER PROVIDER NOTE        CHIEF COMPLAINT  Chief Complaint   Patient presents with   • Vaginal Bleeding     Pt stated she stated spotting today. Blood is bright red. Pt is cramping \"upper not lower\"   • Pregnancy     Third pregnancy. 2 prior abortions. Pt is unsure how far along she is.       HPI  Zita Mcintosh is a 31 y.o. female who presents to the emergency department complaining of vaginal bleeding the setting of early pregnancy.  Patient reports she started having some spotting earlier today as well as some lower abdominal cramping.  She has not noticed any heavy bleeding or clots.  No other vaginal discharge.  She does have some mild nausea through the pregnancy but no vomiting.  No recent illness fevers chills cough or congestion.    Last menstrual period in August but she is not exactly sure when    G3, P0 with 2 prior abortions    REVIEW OF SYSTEMS  Pertinent positives include vaginal bleeding, pregnancy. Pertinent negatives include no fever. See HPI for details. All other systems reviewed and are negative.    PAST MEDICAL HISTORY   has a past medical history of Kidney stone, Marijuana use, Pyelonephritis, Smoker, and UTI (urinary tract infection).    SURGICAL HISTORY  patient denies any surgical history    FAMILY HISTORY  Family History   Problem Relation Age of Onset   • Lung Disease Mother         copd   • Psychiatric Illness Father         epilepsy       SOCIAL HISTORY  Social History     Socioeconomic History   • Marital status: Single     Spouse name: Not on file   • Number of children: Not on file   • Years of education: Not on file   • Highest education level: Not on file   Occupational History   • Not on file   Tobacco Use   • Smoking status: Current Every Day Smoker     Packs/day: 1.00     Types: Cigarettes   • Smokeless tobacco: Never Used   • Tobacco comment: 15    Substance and Sexual Activity   • Alcohol use: Yes     Alcohol/week: 0.5 oz     Types: 1 Cans of beer per week   • " Drug use: Yes     Types: Marijuana, Inhaled     Comment: marijuana daily   • Sexual activity: Not on file   Other Topics Concern   • Not on file   Social History Narrative   • Not on file     Social Determinants of Health     Financial Resource Strain:    • Difficulty of Paying Living Expenses:    Food Insecurity:    • Worried About Running Out of Food in the Last Year:    • Ran Out of Food in the Last Year:    Transportation Needs:    • Lack of Transportation (Medical):    • Lack of Transportation (Non-Medical):    Physical Activity:    • Days of Exercise per Week:    • Minutes of Exercise per Session:    Stress:    • Feeling of Stress :    Social Connections:    • Frequency of Communication with Friends and Family:    • Frequency of Social Gatherings with Friends and Family:    • Attends [employer] Services:    • Active Member of Clubs or Organizations:    • Attends Club or Organization Meetings:    • Marital Status:    Intimate Partner Violence:    • Fear of Current or Ex-Partner:    • Emotionally Abused:    • Physically Abused:    • Sexually Abused:       Social History     Substance and Sexual Activity   Drug Use Yes   • Types: Marijuana, Inhaled    Comment: marijuana daily       CURRENT MEDICATIONS  Home Medications     Reviewed by Stefani Wiggins R.N. (Registered Nurse) on 10/16/21 at 1702  Med List Status: Partial   Medication Last Dose Status        Patient Pipe Taking any Medications                       ALLERGIES  Allergies   Allergen Reactions   • Azithromycin    • Ibuprofen        PHYSICAL EXAM  VITAL SIGNS: /69   Pulse 62   Temp 36.6 °C (97.9 °F) (Oral)   Resp 20   Ht 1.524 m (5')   Wt 65.3 kg (143 lb 15.4 oz)   SpO2 98%   BMI 28.12 kg/m²   Pulse ox interpretation: I interpret this pulse ox as normal.    Constitutional: Alert in no apparent distress.  HENT: No signs of trauma, Bilateral external ears normal, Nose normal.   Eyes: Pupils are equal, Conjunctiva normal, Non-icteric.   Neck:  Normal range of motion, No tenderness, Supple, No stridor.   Cardiovascular: Regular rate and rhythm, no murmurs.   Thorax & Lungs: Normal breath sounds, No respiratory distress, No wheezing, No chest tenderness.   Abdomen: Bowel sounds normal, Soft, No tenderness, No masses, No pulsatile masses. No peritoneal signs.  Skin: Warm, Dry, No erythema, No rash.   Back: No bony tenderness, No CVA tenderness.   Extremities: Intact distal pulses, No edema, No tenderness, No cyanosis,   Musculoskeletal: Good range of motion in all major joints. No tenderness to palpation or major deformities noted.   Neurologic: Alert , Normal motor function, Normal sensory function, No focal deficits noted.   Psychiatric: Affect normal, Judgment normal, Mood normal.     DIAGNOSTIC STUDIES / PROCEDURES      LABS  Labs Reviewed   CBC WITH DIFFERENTIAL - Abnormal; Notable for the following components:       Result Value    WBC 14.5 (*)     Neutrophils (Absolute) 9.18 (*)     Monos (Absolute) 1.14 (*)     All other components within normal limits   COMP METABOLIC PANEL - Abnormal; Notable for the following components:    ALT(SGPT) 58 (*)     All other components within normal limits   HCG QUANTITATIVE - Abnormal; Notable for the following components:    Bhcg 21979.0 (*)     All other components within normal limits   LIPASE   URINALYSIS,CULTURE IF INDICATED    Narrative:     Indication for culture:->Pregnant women: fever and/or  asymptomatic screening   ESTIMATED GFR   RH TYPE FOR RHOGAM FROM E.D.    Narrative:     Print Consent?->No       All labs reviewed by me.    RADIOLOGY  US-OB 1ST TRIMESTER WITH TRANSVAGINAL (COMBO)   Final Result      1.  Viable single intrauterine gestation of an estimated sonographic gestational age 7 weeks and 4 days and estimated due date 5/31/2022.   2.  Small subchorionic hemorrhage.        The radiologist's interpretation of all radiological studies have been reviewed and images independently viewed by  me.    COURSE & MEDICAL DECISION MAKING  Nursing notes, VS, PMSFHx reviewed in chart.    11:12 PM Patient seen and examined at bedside.  Patient had ultrasound as well as labs ordered at triage    12:24 AM  Patient is reevaluated, she is comfortable, updated on all results and she is agreeable to discharge          Decision Making:  This is a 31 y.o. female with vaginal bleeding in early pregnancy likely represents threatened miscarriage.   Her ultrasound demonstrates live IUP with no evidence of ectopic pregnancy.  She has no ultrasound evidence of and her pain is not lateralized or consistent with ovarian torsion.  She is Rh+, no findings of significant anemia.  She will follow-up to the pregnancy center, discussed return precautions as well as pelvic rest and home care    The patient will return for new or worsening symptoms and is stable at the time of discharge.    The patient is referred to a primary physician for blood pressure management, diabetic screening, and for all other preventative health concerns.        DISPOSITION:  Patient will be discharged home in stable condition.    FOLLOW UP:  [facility] [address]  [facility] [id number] [phone]  Schedule an appointment as soon as possible for a visit         OUTPATIENT MEDICATIONS:  New Prescriptions    No medications on file         FINAL IMPRESSION  1. Threatened miscarriage in early pregnancy         The note accurately reflects work and decisions made by me.  Wale Avina M.D.  10/17/2021  12:39 AM

## 2021-10-17 NOTE — ED NOTES
Patient refused IV and further needlestick at this time until seen by MD and will reevaluate if she really needs one. Patient currently stable at this time.   32 y/o F with no significant PMHx presents to ED with rectal bleeding x2 days. Plan for rectal exam, obtain labs, and reassess. 32 y/o F with no significant PMHx presents to ED with rectal bleeding x2 days. Plan for rectal exam, obtain labs, and reassess. Pt will f/u with GI doctor, advised to return if start to experience dizziness, lightheadedness, SOB or other signs of anemia

## 2021-10-17 NOTE — ED TRIAGE NOTES
"Chief Complaint   Patient presents with   • Vaginal Bleeding     Pt stated she stated spotting today. Blood is bright red. Pt is cramping \"upper not lower\"   • Pregnancy     Third pregnancy. 2 prior abortions. Pt is unsure how far along she is.     Pt ambulatory to triage room with steady gait. VSS on RA. GCS 15. NAD.    Pt denies s/sx of COVID or recent exposure. Pt is unvaccinated against COVID    Pt returned to lobby. Pt educated on triage process. Pt understands to notify staff of any changes or worsening of symptoms.    /78   Pulse (!) 103   Temp 37 °C (98.6 °F) (Temporal)   Resp 16   Ht 1.524 m (5')   Wt 65.3 kg (143 lb 15.4 oz)   SpO2 97%   BMI 28.12 kg/m²     "

## 2021-12-04 ENCOUNTER — HOSPITAL ENCOUNTER (EMERGENCY)
Facility: MEDICAL CENTER | Age: 31
End: 2021-12-05
Attending: EMERGENCY MEDICINE
Payer: MEDICAID

## 2021-12-04 ENCOUNTER — APPOINTMENT (OUTPATIENT)
Dept: RADIOLOGY | Facility: MEDICAL CENTER | Age: 31
End: 2021-12-04
Attending: EMERGENCY MEDICINE
Payer: MEDICAID

## 2021-12-04 DIAGNOSIS — R10.9 FLANK PAIN: ICD-10-CM

## 2021-12-04 DIAGNOSIS — N20.0 RENAL STONE: Primary | ICD-10-CM

## 2021-12-04 DIAGNOSIS — Z3A.15 15 WEEKS GESTATION OF PREGNANCY: ICD-10-CM

## 2021-12-04 LAB
ALBUMIN SERPL BCP-MCNC: 3.6 G/DL (ref 3.2–4.9)
ALBUMIN/GLOB SERPL: 1.2 G/DL
ALP SERPL-CCNC: 60 U/L (ref 30–99)
ALT SERPL-CCNC: 14 U/L (ref 2–50)
ANION GAP SERPL CALC-SCNC: 12 MMOL/L (ref 7–16)
APPEARANCE UR: ABNORMAL
AST SERPL-CCNC: 18 U/L (ref 12–45)
BACTERIA #/AREA URNS HPF: ABNORMAL /HPF
BASOPHILS # BLD AUTO: 0.5 % (ref 0–1.8)
BASOPHILS # BLD: 0.06 K/UL (ref 0–0.12)
BILIRUB SERPL-MCNC: <0.2 MG/DL (ref 0.1–1.5)
BILIRUB UR QL STRIP.AUTO: NEGATIVE
BUN SERPL-MCNC: 6 MG/DL (ref 8–22)
CALCIUM SERPL-MCNC: 8.5 MG/DL (ref 8.4–10.2)
CHLORIDE SERPL-SCNC: 106 MMOL/L (ref 96–112)
CO2 SERPL-SCNC: 18 MMOL/L (ref 20–33)
COLOR UR: ABNORMAL
CREAT SERPL-MCNC: 0.45 MG/DL (ref 0.5–1.4)
EOSINOPHIL # BLD AUTO: 0.29 K/UL (ref 0–0.51)
EOSINOPHIL NFR BLD: 2.6 % (ref 0–6.9)
ERYTHROCYTE [DISTWIDTH] IN BLOOD BY AUTOMATED COUNT: 43 FL (ref 35.9–50)
GLOBULIN SER CALC-MCNC: 2.9 G/DL (ref 1.9–3.5)
GLUCOSE SERPL-MCNC: 91 MG/DL (ref 65–99)
GLUCOSE UR STRIP.AUTO-MCNC: NEGATIVE MG/DL
HCG SERPL QL: POSITIVE
HCT VFR BLD AUTO: 37.6 % (ref 37–47)
HGB BLD-MCNC: 12.4 G/DL (ref 12–16)
IMM GRANULOCYTES # BLD AUTO: 0.04 K/UL (ref 0–0.11)
IMM GRANULOCYTES NFR BLD AUTO: 0.4 % (ref 0–0.9)
KETONES UR STRIP.AUTO-MCNC: NEGATIVE MG/DL
LEUKOCYTE ESTERASE UR QL STRIP.AUTO: ABNORMAL
LYMPHOCYTES # BLD AUTO: 2.86 K/UL (ref 1–4.8)
LYMPHOCYTES NFR BLD: 25.6 % (ref 22–41)
MCH RBC QN AUTO: 30.1 PG (ref 27–33)
MCHC RBC AUTO-ENTMCNC: 33 G/DL (ref 33.6–35)
MCV RBC AUTO: 91.3 FL (ref 81.4–97.8)
MICRO URNS: ABNORMAL
MONOCYTES # BLD AUTO: 0.81 K/UL (ref 0–0.85)
MONOCYTES NFR BLD AUTO: 7.3 % (ref 0–13.4)
NEUTROPHILS # BLD AUTO: 7.11 K/UL (ref 2–7.15)
NEUTROPHILS NFR BLD: 63.6 % (ref 44–72)
NITRITE UR QL STRIP.AUTO: NEGATIVE
NRBC # BLD AUTO: 0 K/UL
NRBC BLD-RTO: 0 /100 WBC
PH UR STRIP.AUTO: 7 [PH] (ref 5–8)
PLATELET # BLD AUTO: 343 K/UL (ref 164–446)
PMV BLD AUTO: 9.3 FL (ref 9–12.9)
POTASSIUM SERPL-SCNC: 4.1 MMOL/L (ref 3.6–5.5)
PROT SERPL-MCNC: 6.5 G/DL (ref 6–8.2)
PROT UR QL STRIP: NEGATIVE MG/DL
RBC # BLD AUTO: 4.12 M/UL (ref 4.2–5.4)
RBC # URNS HPF: ABNORMAL /HPF
RBC UR QL AUTO: ABNORMAL
SODIUM SERPL-SCNC: 136 MMOL/L (ref 135–145)
SP GR UR STRIP.AUTO: 1.01
WBC # BLD AUTO: 11.2 K/UL (ref 4.8–10.8)
WBC #/AREA URNS HPF: ABNORMAL /HPF

## 2021-12-04 PROCEDURE — 85025 COMPLETE CBC W/AUTO DIFF WBC: CPT

## 2021-12-04 PROCEDURE — A9270 NON-COVERED ITEM OR SERVICE: HCPCS | Performed by: EMERGENCY MEDICINE

## 2021-12-04 PROCEDURE — 81001 URINALYSIS AUTO W/SCOPE: CPT

## 2021-12-04 PROCEDURE — 700102 HCHG RX REV CODE 250 W/ 637 OVERRIDE(OP): Performed by: EMERGENCY MEDICINE

## 2021-12-04 PROCEDURE — 700105 HCHG RX REV CODE 258: Performed by: EMERGENCY MEDICINE

## 2021-12-04 PROCEDURE — 80053 COMPREHEN METABOLIC PANEL: CPT

## 2021-12-04 PROCEDURE — 84703 CHORIONIC GONADOTROPIN ASSAY: CPT

## 2021-12-04 PROCEDURE — 99284 EMERGENCY DEPT VISIT MOD MDM: CPT

## 2021-12-04 RX ORDER — FERROUS SULFATE 325(65) MG
325 TABLET ORAL DAILY
Status: SHIPPED | COMMUNITY
End: 2022-11-26

## 2021-12-04 RX ORDER — ACETAMINOPHEN 500 MG
1000 TABLET ORAL ONCE
Status: COMPLETED | OUTPATIENT
Start: 2021-12-04 | End: 2021-12-04

## 2021-12-04 RX ORDER — SODIUM CHLORIDE 9 MG/ML
1000 INJECTION, SOLUTION INTRAVENOUS ONCE
Status: COMPLETED | OUTPATIENT
Start: 2021-12-04 | End: 2021-12-05

## 2021-12-04 RX ADMIN — ACETAMINOPHEN 1000 MG: 500 TABLET ORAL at 22:49

## 2021-12-04 RX ADMIN — SODIUM CHLORIDE 1000 ML: 9 INJECTION, SOLUTION INTRAVENOUS at 22:49

## 2021-12-04 ASSESSMENT — PAIN DESCRIPTION - DESCRIPTORS: DESCRIPTORS: STABBING

## 2021-12-04 ASSESSMENT — FIBROSIS 4 INDEX: FIB4 SCORE: 0.25

## 2021-12-05 VITALS
HEART RATE: 89 BPM | SYSTOLIC BLOOD PRESSURE: 134 MMHG | DIASTOLIC BLOOD PRESSURE: 71 MMHG | TEMPERATURE: 97.6 F | BODY MASS INDEX: 29.35 KG/M2 | WEIGHT: 149.47 LBS | HEIGHT: 60 IN | RESPIRATION RATE: 15 BRPM | OXYGEN SATURATION: 97 %

## 2021-12-05 PROCEDURE — 76815 OB US LIMITED FETUS(S): CPT

## 2021-12-05 PROCEDURE — 76775 US EXAM ABDO BACK WALL LIM: CPT

## 2021-12-05 NOTE — ED TRIAGE NOTES
Pt here with c/o    Chief Complaint   Patient presents with   • Flank Pain     left sided flank pain- hx of kidney stones   • Pregnancy     pt states she is 4 mos pregnant       /84   Pulse 94   Temp 36.7 °C (98 °F) (Temporal)   Resp 18   Ht 1.524 m (5')   Wt 67.8 kg (149 lb 7.6 oz)   LMP 08/11/2021 (Approximate)   SpO2 100%   BMI 29.19 kg/m²      Has this patient been vaccinated for COVID No  If not, would they like to be vaccinated while in the ER if eligible?  NO  Would the patient like to speak with the ERP about the possibility of receiving the COVID vaccine today before making a decision? NO

## 2021-12-05 NOTE — ED PROVIDER NOTES
ED Provider Note     2021  10:01 PM    Means of Arrival: Walk In  History obtained by: patient  Limitations: None  PCP: none  CODE STATUS: Full    CHIEF COMPLAINT  Chief Complaint   Patient presents with   • Flank Pain     left sided flank pain- hx of kidney stones   • Pregnancy     pt states she is 4 mos pregnant       HPI  Zita Mcintosh is a 31 y.o. female  who presents with concerns of left-sided flank pain.  She is approximately 4 months pregnant.  She has had no vaginal bleeding but has noticed some blood in urine.  She has history of kidney stones.  Pain is left flank, colicky.  No fevers.  She has had some vomiting.  She has not taken anything for the pain.  She is established with OB/GYN and has a follow-up appointment this coming Monday.    REVIEW OF SYSTEMS  Review of Systems   All other systems reviewed and are negative.    See HPI for further details.    PAST MEDICAL HISTORY   has a past medical history of Kidney stone, Marijuana use, Pyelonephritis, Smoker, and UTI (urinary tract infection).    FAMILY HISTORY  Family History   Problem Relation Age of Onset   • Lung Disease Mother         copd   • Psychiatric Illness Father         epilepsy       SOCIAL HISTORY  Social History     Tobacco Use   • Smoking status: Current Every Day Smoker     Packs/day: 1.00     Types: Cigarettes   • Smokeless tobacco: Never Used   • Tobacco comment: 15    Vaping Use   • Vaping Use: Never used   Substance and Sexual Activity   • Alcohol use: Not Currently     Alcohol/week: 0.5 oz     Types: 1 Cans of beer per week   • Drug use: Yes     Types: Marijuana, Inhaled     Comment: marijuana daily   • Sexual activity: Not on file       SURGICAL HISTORY  patient denies any surgical history    CURRENT MEDICATIONS  Home Medications     Reviewed by Joana Rhodes R.N. (Registered Nurse) on 21 at 9716  Med List Status: Partial   Medication Last Dose Status   ferrous sulfate 325 (65 Fe) MG tablet 2021 Active    MULTIPLE VITAMIN PO 12/4/2021 Active   Prenatal MV-Min-Fe Fum-FA-DHA (PRENATAL 1 PO) 12/4/2021 Active                ALLERGIES  Allergies   Allergen Reactions   • Azithromycin        PHYSICAL EXAM  VITAL SIGNS: /71   Pulse 89   Temp 36.4 °C (97.6 °F) (Temporal)   Resp 15   Ht 1.524 m (5')   Wt 67.8 kg (149 lb 7.6 oz)   LMP 08/11/2021 (Approximate)   SpO2 97%   BMI 29.19 kg/m²     Pulse ox interpretation: I interpret this pulse ox as normal.  Constitutional: Pleasant 31-year-old woman  HENT: No signs of trauma, Bilateral external ears normal, Nose normal.   Eyes: Pupils are equal, Conjunctiva normal, Non-icteric.   Neck: Normal range of motion, No tenderness, Supple, No stridor.   Cardiovascular: Regular rate and rhythm, no murmurs. Symmetric distal pulses. No cyanosis of extremities. No peripheral edema of extremities.  Thorax & Lungs: Normal breath sounds, No respiratory distress, No wheezing, No chest tenderness.   Abdomen:  Soft, No tenderness.   Skin: Warm, Dry, No erythema, No rash.   Back: No midline bony tenderness, left CVA pain to percussion.  Musculoskeletal: Good range of motion in all major joints. No tenderness to palpation or major deformities noted.   Neurologic: Alert , Normal motor function, Normal sensory function, No focal deficits noted.   Psychiatric: Affect normal, Judgment normal, Mood normal.   Physical Exam      DIAGNOSTIC STUDIES / PROCEDURES      LABS  Pertinent Labs & Imaging studies reviewed. (See chart for details)    RADIOLOGY  Pertinent Labs & Imaging studies reviewed. (See chart for details)    COURSE & MEDICAL DECISION MAKING  Pertinent Labs & Imaging studies reviewed. (See chart for details)    10:01 PM This is an emergent evaluation of a  31 y.o. female who presents with left-sided flank pain.  She is 4 months pregnant but has no pelvic pain, vaginal bleeding.  Most likely kidney stone.  Plan for renal ultrasound, and pelvic ultrasound.  Labs CBC, CMP, urinalysis  pending.  Labs ordered per protocol at triage.  Will treat pain with Tylenol and IV fluids.  IV fluids because she has been vomiting and feels dehydrated.      1:25 AM  Labs revealed bicarb 18 which is likely due to vomiting and dehydration.  No leukocytosis.  Urinalysis shows trace leukocyte esterase and occult blood, rare bacteria.  This is not necessarily consistent with UTI.  Cultures pending.  Ultrasound was completed and shows left-sided renal stones and mild prominence of the left renal pelvis.  I believe her pain is likely due to a kidney stone.  She has had significant relief from fluids and Tylenol.  I have asked her to keep her appointment with her OB/GYN provider in 2 days, be sure to tell provider about kidney stones.     The patient will return for worsening symptoms and is stable at the time of discharge. The patient verbalizes understanding. Guidance was provided on appropriate use of medications including driving under the influence, overdose, and side effects.         FINAL IMPRESSION    ICD-10-CM   1. Renal stone Active N20.0   2. Flank pain  R10.9   3. 15 weeks gestation of pregnancy  Z3A.15            This dictation was created using voice recognition software. The accuracy of the dictation is limited to the abilities of the software. I expect there may be some errors of grammar and possibly content. The nursing notes were reviewed and certain aspects of this information were incorporated into this note.    Electronically signed by: Natanael Gerardo II, M.D., 12/4/2021 10:01 PM

## 2022-04-03 ENCOUNTER — HOSPITAL ENCOUNTER (EMERGENCY)
Facility: MEDICAL CENTER | Age: 32
End: 2022-04-03
Attending: EMERGENCY MEDICINE
Payer: MEDICAID

## 2022-04-03 ENCOUNTER — APPOINTMENT (OUTPATIENT)
Dept: RADIOLOGY | Facility: MEDICAL CENTER | Age: 32
End: 2022-04-03
Attending: EMERGENCY MEDICINE
Payer: MEDICAID

## 2022-04-03 VITALS
BODY MASS INDEX: 35.1 KG/M2 | SYSTOLIC BLOOD PRESSURE: 112 MMHG | HEIGHT: 60 IN | HEART RATE: 103 BPM | DIASTOLIC BLOOD PRESSURE: 74 MMHG | RESPIRATION RATE: 16 BRPM | TEMPERATURE: 97.1 F | WEIGHT: 178.79 LBS | OXYGEN SATURATION: 98 %

## 2022-04-03 DIAGNOSIS — Z3A.33 33 WEEKS GESTATION OF PREGNANCY: ICD-10-CM

## 2022-04-03 DIAGNOSIS — L29.9 ITCHING: ICD-10-CM

## 2022-04-03 DIAGNOSIS — D64.9 ANEMIA, UNSPECIFIED TYPE: ICD-10-CM

## 2022-04-03 LAB
ALBUMIN SERPL BCP-MCNC: 3.4 G/DL (ref 3.2–4.9)
ALBUMIN/GLOB SERPL: 1.2 G/DL
ALP SERPL-CCNC: 154 U/L (ref 30–99)
ALT SERPL-CCNC: 16 U/L (ref 2–50)
ANION GAP SERPL CALC-SCNC: 12 MMOL/L (ref 7–16)
AST SERPL-CCNC: 20 U/L (ref 12–45)
BASOPHILS # BLD AUTO: 0.3 % (ref 0–1.8)
BASOPHILS # BLD: 0.03 K/UL (ref 0–0.12)
BILIRUB CONJ SERPL-MCNC: <0.2 MG/DL (ref 0.1–0.5)
BILIRUB INDIRECT SERPL-MCNC: NORMAL MG/DL (ref 0–1)
BILIRUB SERPL-MCNC: 0.2 MG/DL (ref 0.1–1.5)
BUN SERPL-MCNC: 4 MG/DL (ref 8–22)
CALCIUM SERPL-MCNC: 8.5 MG/DL (ref 8.5–10.5)
CHLORIDE SERPL-SCNC: 106 MMOL/L (ref 96–112)
CO2 SERPL-SCNC: 20 MMOL/L (ref 20–33)
CREAT SERPL-MCNC: 0.39 MG/DL (ref 0.5–1.4)
EOSINOPHIL # BLD AUTO: 0.25 K/UL (ref 0–0.51)
EOSINOPHIL NFR BLD: 2.2 % (ref 0–6.9)
ERYTHROCYTE [DISTWIDTH] IN BLOOD BY AUTOMATED COUNT: 42.7 FL (ref 35.9–50)
GFR SERPLBLD CREATININE-BSD FMLA CKD-EPI: 136 ML/MIN/1.73 M 2
GGT SERPL-CCNC: 20 U/L (ref 7–34)
GLOBULIN SER CALC-MCNC: 2.8 G/DL (ref 1.9–3.5)
GLUCOSE SERPL-MCNC: 87 MG/DL (ref 65–99)
HCT VFR BLD AUTO: 30.4 % (ref 37–47)
HGB BLD-MCNC: 9.5 G/DL (ref 12–16)
IMM GRANULOCYTES # BLD AUTO: 0.21 K/UL (ref 0–0.11)
IMM GRANULOCYTES NFR BLD AUTO: 1.8 % (ref 0–0.9)
LIPASE SERPL-CCNC: 39 U/L (ref 11–82)
LYMPHOCYTES # BLD AUTO: 1.85 K/UL (ref 1–4.8)
LYMPHOCYTES NFR BLD: 16 % (ref 22–41)
MCH RBC QN AUTO: 26 PG (ref 27–33)
MCHC RBC AUTO-ENTMCNC: 31.3 G/DL (ref 33.6–35)
MCV RBC AUTO: 83.1 FL (ref 81.4–97.8)
MONOCYTES # BLD AUTO: 0.93 K/UL (ref 0–0.85)
MONOCYTES NFR BLD AUTO: 8 % (ref 0–13.4)
NEUTROPHILS # BLD AUTO: 8.29 K/UL (ref 2–7.15)
NEUTROPHILS NFR BLD: 71.7 % (ref 44–72)
NRBC # BLD AUTO: 0.03 K/UL
NRBC BLD-RTO: 0.3 /100 WBC
PLATELET # BLD AUTO: 340 K/UL (ref 164–446)
PMV BLD AUTO: 10.5 FL (ref 9–12.9)
POTASSIUM SERPL-SCNC: 3.3 MMOL/L (ref 3.6–5.5)
PROT SERPL-MCNC: 6.2 G/DL (ref 6–8.2)
RBC # BLD AUTO: 3.66 M/UL (ref 4.2–5.4)
SODIUM SERPL-SCNC: 138 MMOL/L (ref 135–145)
WBC # BLD AUTO: 11.6 K/UL (ref 4.8–10.8)

## 2022-04-03 PROCEDURE — 82977 ASSAY OF GGT: CPT

## 2022-04-03 PROCEDURE — 76815 OB US LIMITED FETUS(S): CPT

## 2022-04-03 PROCEDURE — 76705 ECHO EXAM OF ABDOMEN: CPT

## 2022-04-03 PROCEDURE — 80053 COMPREHEN METABOLIC PANEL: CPT

## 2022-04-03 PROCEDURE — 99283 EMERGENCY DEPT VISIT LOW MDM: CPT

## 2022-04-03 PROCEDURE — 83690 ASSAY OF LIPASE: CPT

## 2022-04-03 PROCEDURE — 36415 COLL VENOUS BLD VENIPUNCTURE: CPT

## 2022-04-03 PROCEDURE — 82248 BILIRUBIN DIRECT: CPT

## 2022-04-03 PROCEDURE — 85025 COMPLETE CBC W/AUTO DIFF WBC: CPT

## 2022-04-03 ASSESSMENT — FIBROSIS 4 INDEX: FIB4 SCORE: 0.45

## 2022-04-03 ASSESSMENT — ENCOUNTER SYMPTOMS
CHILLS: 0
FEVER: 0
VOMITING: 0
NAUSEA: 0

## 2022-04-03 NOTE — ED TRIAGE NOTES
".Zita Mcintosh  .  Chief Complaint   Patient presents with   • Itching     Patient c/o \"feeling itchy all over\" x 1 week.      Patient ambulatory to triage with above complaint. Patient is 33 weeks pregnant, . Was seen by OB/GYN for same on 22 and had blood drawn.     Patient to lobby and instructed to inform staff of any needs.  "

## 2022-04-03 NOTE — ED PROVIDER NOTES
"ED Provider Note    CHIEF COMPLAINT  Chief Complaint   Patient presents with   • Itching     Patient c/o \"feeling itchy all over\" x 1 week.        HPI  Zita Mcintosh is a 32 y.o. G1, P0 female at 33 weeks gestation presenting to the emergency department for evaluation of itching which has been present for one week.  Patient reports that she was seen last week by her OB/GYN for the same complaint, but has not yet received the laboratory results from 2 days ago.  She is concerned, because she feels like she is getting more itchy.  She denies any lesions on her skin, and states that the itchiness is all over including her palms and soles of her feet.  She has not had any changes to soaps, detergents, or lotions.  She denies any vaginal bleeding or loss of fluid.  She does have intermittent pains across the front of her abdomen especially associated with coughing or sneezing.  She denies any cramping, recent nausea, or vomiting.  She has not taken any medications aside from prenatal vitamins.    REVIEW OF SYSTEMS  Review of Systems   Constitutional: Negative for chills and fever.   Gastrointestinal: Negative for nausea and vomiting.   Skin: Positive for itching. Negative for rash.   All other systems reviewed and are negative.      PAST MEDICAL HISTORY   has a past medical history of Kidney stone, Marijuana use, Pyelonephritis, Smoker, and UTI (urinary tract infection).    SOCIAL HISTORY  Social History     Tobacco Use   • Smoking status: Current Every Day Smoker     Packs/day: 1.00     Types: Cigarettes   • Smokeless tobacco: Never Used   • Tobacco comment: 15    Vaping Use   • Vaping Use: Never used   Substance and Sexual Activity   • Alcohol use: Not Currently     Alcohol/week: 0.5 oz     Types: 1 Cans of beer per week   • Drug use: Yes     Types: Marijuana, Inhaled     Comment: marijuana daily   • Sexual activity: Not on file       SURGICAL HISTORY  patient denies any surgical history    CURRENT MEDICATIONS  Home " Medications     Reviewed by Carly Hernandez R.N. (Registered Nurse) on 04/03/22 at 0914  Med List Status: Complete   Medication Last Dose Status   ferrous sulfate 325 (65 Fe) MG tablet  Active   MULTIPLE VITAMIN PO  Active   Prenatal MV-Min-Fe Fum-FA-DHA (PRENATAL 1 PO)  Active                ALLERGIES  Allergies   Allergen Reactions   • Azithromycin        PHYSICAL EXAM  VITAL SIGNS: /80   Pulse (!) 105   Temp 36.1 °C (96.9 °F) (Temporal)   Resp (!) 22   Ht 1.524 m (5')   Wt 81.1 kg (178 lb 12.7 oz)   LMP 08/11/2021 (Approximate)   SpO2 99%   BMI 34.92 kg/m²    Pulse ox interpretation: I interpret this pulse ox as normal.  Constitutional: Alert in no apparent distress.  HENT: Normocephalic, Atraumatic, Bilateral external ears normal. Nose normal.   Eyes: Pupils are equal and reactive. Conjunctiva normal, non-icteric.   Heart: Regular rate and rhythm  Lungs: Clear to auscultation bilaterally.  Abdomen: Soft, gravid, uterine height consistent with dates. Nontender, normal bowel sounds  Skin: Warm, Dry, No erythema, No rash. Scratch marks present  Neurologic: Alert, Grossly non-focal.   Psychiatric: Affect normal, Judgment normal, Mood normal, Appears appropriate and not intoxicated.     Diagnostic Studies:  Results for orders placed or performed during the hospital encounter of 04/03/22   CBC WITH DIFFERENTIAL   Result Value Ref Range    WBC 11.6 (H) 4.8 - 10.8 K/uL    RBC 3.66 (L) 4.20 - 5.40 M/uL    Hemoglobin 9.5 (L) 12.0 - 16.0 g/dL    Hematocrit 30.4 (L) 37.0 - 47.0 %    MCV 83.1 81.4 - 97.8 fL    MCH 26.0 (L) 27.0 - 33.0 pg    MCHC 31.3 (L) 33.6 - 35.0 g/dL    RDW 42.7 35.9 - 50.0 fL    Platelet Count 340 164 - 446 K/uL    MPV 10.5 9.0 - 12.9 fL    Neutrophils-Polys 71.70 44.00 - 72.00 %    Lymphocytes 16.00 (L) 22.00 - 41.00 %    Monocytes 8.00 0.00 - 13.40 %    Eosinophils 2.20 0.00 - 6.90 %    Basophils 0.30 0.00 - 1.80 %    Immature Granulocytes 1.80 (H) 0.00 - 0.90 %    Nucleated RBC 0.30  /100 WBC    Neutrophils (Absolute) 8.29 (H) 2.00 - 7.15 K/uL    Lymphs (Absolute) 1.85 1.00 - 4.80 K/uL    Monos (Absolute) 0.93 (H) 0.00 - 0.85 K/uL    Eos (Absolute) 0.25 0.00 - 0.51 K/uL    Baso (Absolute) 0.03 0.00 - 0.12 K/uL    Immature Granulocytes (abs) 0.21 (H) 0.00 - 0.11 K/uL    NRBC (Absolute) 0.03 K/uL   COMP METABOLIC PANEL   Result Value Ref Range    Sodium 138 135 - 145 mmol/L    Potassium 3.3 (L) 3.6 - 5.5 mmol/L    Chloride 106 96 - 112 mmol/L    Co2 20 20 - 33 mmol/L    Anion Gap 12.0 7.0 - 16.0    Glucose 87 65 - 99 mg/dL    Bun 4 (L) 8 - 22 mg/dL    Creatinine 0.39 (L) 0.50 - 1.40 mg/dL    Calcium 8.5 8.5 - 10.5 mg/dL    AST(SGOT) 20 12 - 45 U/L    ALT(SGPT) 16 2 - 50 U/L    Alkaline Phosphatase 154 (H) 30 - 99 U/L    Total Bilirubin 0.2 0.1 - 1.5 mg/dL    Albumin 3.4 3.2 - 4.9 g/dL    Total Protein 6.2 6.0 - 8.2 g/dL    Globulin 2.8 1.9 - 3.5 g/dL    A-G Ratio 1.2 g/dL   LIPASE   Result Value Ref Range    Lipase 39 11 - 82 U/L   BILIRUBIN DIRECT   Result Value Ref Range    Direct Bilirubin <0.2 0.1 - 0.5 mg/dL   GAMMA GT (GGT)   Result Value Ref Range    Gamma Gt 20 7 - 34 U/L   BILIRUBIN INDIRECT   Result Value Ref Range    Indirect Bilirubin see below 0.0 - 1.0 mg/dL   ESTIMATED GFR   Result Value Ref Range    GFR (CKD-EPI) 136 >60 mL/min/1.73 m 2       US-RUQ   Final Result      1.  Mild dilation of the right renal pelvis.      2.  Otherwise unremarkable right upper quadrant ultrasound.      US-OB LIMITED TRANSABDOMINAL   Final Result      Single intrauterine pregnancy of an estimated gestational age of 33 weeks, 6 days with an estimated date of delivery of 05/16/2022.             These results were personally reviewed and interpreted by me.    COURSE & MEDICAL DECISION MAKING    32-year-old G1, P0 at 33 weeks gestation presents emergency department for evaluation of pruritus.  On my exam, she was initially tachycardic in triage, though this had resolved at the time of my evaluation.  She  had no apparent rash present and I was concerned for possible cholestasis of pregnancy.  Elected to obtain laboratory studies for further evaluation.  Labs were unremarkable without significant bilirubin elevation.  She had mild hypokalemia at 3.3.  She had mild leukocytosis of 11.6 likely due to pregnancy.  She also had anemia with a hemoglobin of 9.5.  Platelet count was within normal limits.    Ultrasound was obtained of the right upper quadrant was unremarkable.  OB ultrasound demonstrates a single intrauterine pregnancy of an estimated gestational age of 33 weeks and 6 days.  I discussed the above results and plan of care with the patient.  I am unclear what is causing her itching, though I do not feel it is related to cholestasis of pregnancy.  I am concerned regarding her anemia, though feel that this may be chronic and due to pregnancy.  Advised to follow-up with her OB/GYN as previously scheduled this week.  Platelet count was within normal limits, and patient remains well-appearing with normal vital signs on my repeat exam.    The patient will return for new or worsening symptoms and is stable at the time of discharge.    The patient is referred to a primary physician for blood pressure management, diabetic screening, and for all other preventative health concerns.      DISPOSITION:  Patient will be discharged home in stable condition.    FOLLOW UP:  [facility], Emergency Dept  [address] [address] [address] [phone]    As needed      OUTPATIENT MEDICATIONS:  Discharge Medication List as of 4/3/2022 12:42 PM               FINAL IMPRESSION  Visit Diagnoses     ICD-10-CM   1. Itching  L29.9   2. 33 weeks gestation of pregnancy  Z3A.33   3. Anemia, unspecified type  D64.9              Electronically signed by: Carlene Bruno M.D., 4/3/2022 10:07 AM

## 2022-04-10 ENCOUNTER — HOSPITAL ENCOUNTER (EMERGENCY)
Facility: MEDICAL CENTER | Age: 32
End: 2022-04-10
Attending: OBSTETRICS & GYNECOLOGY | Admitting: OBSTETRICS & GYNECOLOGY
Payer: MEDICAID

## 2022-04-10 VITALS
DIASTOLIC BLOOD PRESSURE: 64 MMHG | BODY MASS INDEX: 34.75 KG/M2 | TEMPERATURE: 98.1 F | HEIGHT: 60 IN | SYSTOLIC BLOOD PRESSURE: 112 MMHG | HEART RATE: 16 BPM | OXYGEN SATURATION: 99 % | WEIGHT: 177 LBS | RESPIRATION RATE: 102 BRPM

## 2022-04-10 PROCEDURE — 302449 STATCHG TRIAGE ONLY (STATISTIC)

## 2022-04-10 PROCEDURE — 59025 FETAL NON-STRESS TEST: CPT

## 2022-04-10 ASSESSMENT — PAIN DESCRIPTION - PAIN TYPE: TYPE: ACUTE PAIN

## 2022-04-10 ASSESSMENT — FIBROSIS 4 INDEX: FIB4 SCORE: .4705882352941176471

## 2022-04-11 NOTE — PROGRESS NOTES
2255  Pt into triage c/o having pressure tonight when she went to bed and was just very uncomfortable.  Pt has no report of bleeding or leaking and unsure if she is having UC's or not.  Monitors placed at this time.  2230  SVE noted and report to Dr. Devlin.  2245  Orders for DC received at this time and pt given DC instructions.  Pt states understanding to go to her next regular visit and return if she is having increased signs of labor.  Pt understands to return if she is having decreased fetal movements.  Pt to home with FOB and friend via ambulation.

## 2022-04-11 NOTE — DISCHARGE INSTRUCTIONS
Go to your next regular visit.  Return to the hospital if you are having decreased fetal movements.  Return to the hospital if you are having signs of labor or if you think your water breaks.  Call your OB with any concerns.  Eat and drink regular.  Comfort measures at home for pregnancy discomforts.

## 2022-11-26 ENCOUNTER — HOSPITAL ENCOUNTER (EMERGENCY)
Facility: MEDICAL CENTER | Age: 32
End: 2022-11-26
Attending: EMERGENCY MEDICINE
Payer: MEDICAID

## 2022-11-26 ENCOUNTER — APPOINTMENT (OUTPATIENT)
Dept: RADIOLOGY | Facility: MEDICAL CENTER | Age: 32
End: 2022-11-26
Attending: EMERGENCY MEDICINE
Payer: MEDICAID

## 2022-11-26 VITALS
OXYGEN SATURATION: 98 % | DIASTOLIC BLOOD PRESSURE: 68 MMHG | SYSTOLIC BLOOD PRESSURE: 132 MMHG | BODY MASS INDEX: 32.81 KG/M2 | HEIGHT: 60 IN | HEART RATE: 78 BPM | WEIGHT: 167.11 LBS | TEMPERATURE: 97.4 F | RESPIRATION RATE: 16 BRPM

## 2022-11-26 DIAGNOSIS — M65.4 DE QUERVAIN'S TENOSYNOVITIS, RIGHT: ICD-10-CM

## 2022-11-26 PROCEDURE — 99283 EMERGENCY DEPT VISIT LOW MDM: CPT

## 2022-11-26 PROCEDURE — 73110 X-RAY EXAM OF WRIST: CPT | Mod: RT

## 2022-11-26 RX ORDER — ACETAMINOPHEN 325 MG/1
650 TABLET ORAL EVERY 4 HOURS PRN
Status: ON HOLD | COMMUNITY
End: 2023-04-25

## 2022-11-26 ASSESSMENT — LIFESTYLE VARIABLES
CONSUMPTION TOTAL: INCOMPLETE
EVER FELT BAD OR GUILTY ABOUT YOUR DRINKING: NO
EVER HAD A DRINK FIRST THING IN THE MORNING TO STEADY YOUR NERVES TO GET RID OF A HANGOVER: NO
TOTAL SCORE: 0
TOTAL SCORE: 0
HAVE YOU EVER FELT YOU SHOULD CUT DOWN ON YOUR DRINKING: NO
HAVE PEOPLE ANNOYED YOU BY CRITICIZING YOUR DRINKING: NO
TOTAL SCORE: 0
DO YOU DRINK ALCOHOL: NO

## 2022-11-26 ASSESSMENT — FIBROSIS 4 INDEX: FIB4 SCORE: .4705882352941176471

## 2022-11-26 ASSESSMENT — PAIN DESCRIPTION - DESCRIPTORS: DESCRIPTORS: SORE

## 2022-11-26 NOTE — Clinical Note
Zita Mcintosh was seen and treated in our emergency department on 11/26/2022.  She may return to work on 11/27/2022.  Recommend light duty until cleared by primary doctor or orthopedic doctor.     If you have any questions or concerns, please don't hesitate to call.      Devendra Gaona M.D.

## 2022-11-27 NOTE — ED TRIAGE NOTES
Pt states she was told by OB to quit using MJ daily, pt has continued to use MJ daily. Advised importance of quitting.

## 2022-11-27 NOTE — ED NOTES
Pt d/c home ambulatory w/ steady gait.  Cab as ride home.  NAD.  Will f/u w/ PCP as discussed or return for new or concerning sx as discussed.

## 2022-11-27 NOTE — ED NOTES
Call to CPS [phone], report made for continued marijuana use while pregnant. Spoke with Candelaria juan. Per Candelaria please flag chart for a call to CPS when pt delivers, perform UDS on baby. 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

## 2022-11-27 NOTE — ED TRIAGE NOTES
Chief Complaint   Patient presents with    Wrist Pain     Pt arrives to the ER via POV with SO. Pt complains of R wrist pain, no injury. Pt is having a hard time picking up her 6 month old. Pt is currently pregnant with EDC 05/05/2023. Onset months.  Advised NPO until seen by provider. Pt verbalizes understanding.        Pregnancy

## 2022-11-27 NOTE — ED PROVIDER NOTES
ED Provider Note    CHIEF COMPLAINT  Chief Complaint   Patient presents with    Wrist Pain     Pt arrives to the ER via POV with SO. Pt complains of R wrist pain, no injury. Pt is having a hard time picking up her 6 month old. Pt is currently pregnant with EDC 05/05/2023. Onset months.  Advised NPO until seen by provider. Pt verbalizes understanding.        Pregnancy       HPI  Zita Mcintosh is a 32 y.o. female who presents right wrist pain for the past few months.  Worse over the past several days.  Has pain with range of motion to the thumb and pain on the radial aspect of the wrist.  No numbness in the hand.  No history of injury to the wrist or hand.  She is right-hand dominant.  Works as a  and does use her hand frequently for work.  She also is pregnant and is caring for an infant.  No fevers.  No redness or swelling.    REVIEW OF SYSTEMS  See HPI for further details. All other systems are negative.     PAST MEDICAL HISTORY   has a past medical history of Kidney stone, Marijuana use, Pyelonephritis, Smoker, and UTI (urinary tract infection).    SOCIAL HISTORY  Social History     Tobacco Use    Smoking status: Every Day     Packs/day: 0.50     Types: Cigarettes    Smokeless tobacco: Never    Tobacco comments:     15    Vaping Use    Vaping Use: Never used   Substance and Sexual Activity    Alcohol use: Not Currently     Alcohol/week: 0.5 oz     Types: 1 Cans of beer per week    Drug use: Yes     Types: Marijuana, Inhaled     Comment: marijuana daily education given. Advised to quit.    Sexual activity: Not on file       SURGICAL HISTORY  patient denies any surgical history    CURRENT MEDICATIONS  Home Medications       Reviewed by Cecilia Vargas R.N. (Registered Nurse) on 11/26/22 at 2039  Med List Status: Complete     Medication Last Dose Status   acetaminophen (TYLENOL) 325 MG Tab 11/25/2022 Active   Prenatal MV-Min-Fe Fum-FA-DHA (PRENATAL 1 PO) 11/26/2022 Active                     ALLERGIES  Allergies   Allergen Reactions    Azithromycin        PHYSICAL EXAM  VITAL SIGNS: BP (!) 141/90   Pulse (!) 109   Temp 36.8 °C (98.2 °F) (Temporal)   Resp 16   Ht 1.524 m (5')   Wt 75.8 kg (167 lb 1.7 oz)   SpO2 96%   BMI 32.64 kg/m²   Pulse ox interpretation: I interpret this pulse ox as normal.  Constitutional: Alert in no apparent distress.  Cardiovascular: Regular rate and rhythm.   Thorax & Lungs: Normal breath sounds, No respiratory distress,  Skin: Warm, Dry, No erythema, No rash.   Extremities: Intact distal pulses, No edema, No tenderness, No cyanosis  Musculoskeletal: Good range of motion in all major joints. No major deformities noted.  Tenderness to the right wrist on the radial aspect.  Positive Finkelstein test.  Negative Phalen's and Tinel sign.  Neurologic: Alert, Normal motor function, Normal sensory function, No focal deficits noted.       DIAGNOSTIC STUDIES / PROCEDURES      RADIOLOGY  DX-WRIST-COMPLETE 3+ RIGHT   Final Result      Borderline ulnar positive variance without evidence of lunate injury            COURSE & MEDICAL DECISION MAKING    Medications - No data to display    Pertinent Labs & Imaging studies reviewed. (See chart for details)  32 y.o. female presenting with right wrist pain.  No history of trauma.  No redness or swelling.  No micromotion tenderness.  Negative Phalen's and Tinel sign.  No obvious signs of carpal tunnel.  No numbness to the hand.  Pain is to the radial aspect of the wrist.  Pain is worsening with the movement of the right thumb and positive Finkelstein test.  This is consistent with de Quervain's tenosynovitis.  Recommending oral analgesic medications such as Tylenol given her current pregnancy status and spica Velcro splint to be used as needed.  To follow-up with orthopedics and may benefit from local injection of steroid.  It was recommended that she conduct light duty while at work until cleared by medical provider either primary care  or orthopedics.  She also may benefit from physical therapy.    Patient was ultimately discharged in the emergency department for further outpatient evaluation and management.    The patient was instructed to follow-up with primary care physician for further management.  To return immediately for any worsening symptoms or development of any other concerning signs or symptoms. The patient verbalizes understanding in their own words.    /68   Pulse 78   Temp 36.3 °C (97.4 °F) (Temporal)   Resp 16   Ht 1.524 m (5')   Wt 75.8 kg (167 lb 1.7 oz)   SpO2 98%   BMI 32.64 kg/m²     The patient was referred to primary care where they will receive further BP management.      [facility], Emergency Dept  [address]  [place] [address]  [phone]    As needed, If symptoms worsen    Primary care doctor    Schedule an appointment as soon as possible for a visit       Naresh Glaser M.D.  [address] [place] [address]  [phone]    Schedule an appointment as soon as possible for a visit       FINAL IMPRESSION  1. De Quervain's tenosynovitis, right            Electronically signed by: Devendra Gaona M.D., 11/26/2022 8:48 PM

## 2023-02-09 ENCOUNTER — HOSPITAL ENCOUNTER (OUTPATIENT)
Facility: MEDICAL CENTER | Age: 33
End: 2023-02-09
Attending: OBSTETRICS & GYNECOLOGY
Payer: MEDICAID

## 2023-02-09 LAB
ABO GROUP BLD: NORMAL
BASOPHILS # BLD AUTO: 0.3 % (ref 0–1.8)
BASOPHILS # BLD: 0.04 K/UL (ref 0–0.12)
BLD GP AB SCN SERPL QL: NORMAL
EOSINOPHIL # BLD AUTO: 0.26 K/UL (ref 0–0.51)
EOSINOPHIL NFR BLD: 2.1 % (ref 0–6.9)
ERYTHROCYTE [DISTWIDTH] IN BLOOD BY AUTOMATED COUNT: 43.7 FL (ref 35.9–50)
GLUCOSE 1H P 50 G GLC PO SERPL-MCNC: 152 MG/DL (ref 70–139)
HBV SURFACE AG SER QL: ABNORMAL
HCT VFR BLD AUTO: 32 % (ref 37–47)
HGB BLD-MCNC: 10 G/DL (ref 12–16)
HIV 1+2 AB+HIV1 P24 AG SERPL QL IA: NORMAL
IMM GRANULOCYTES # BLD AUTO: 0.25 K/UL (ref 0–0.11)
IMM GRANULOCYTES NFR BLD AUTO: 2 % (ref 0–0.9)
LYMPHOCYTES # BLD AUTO: 2.02 K/UL (ref 1–4.8)
LYMPHOCYTES NFR BLD: 16.5 % (ref 22–41)
MCH RBC QN AUTO: 25.4 PG (ref 27–33)
MCHC RBC AUTO-ENTMCNC: 31.3 G/DL (ref 33.6–35)
MCV RBC AUTO: 81.2 FL (ref 81.4–97.8)
MONOCYTES # BLD AUTO: 0.94 K/UL (ref 0–0.85)
MONOCYTES NFR BLD AUTO: 7.7 % (ref 0–13.4)
NEUTROPHILS # BLD AUTO: 8.74 K/UL (ref 2–7.15)
NEUTROPHILS NFR BLD: 71.4 % (ref 44–72)
NRBC # BLD AUTO: 0.03 K/UL
NRBC BLD-RTO: 0.2 /100 WBC
PLATELET # BLD AUTO: 368 K/UL (ref 164–446)
PMV BLD AUTO: 10.6 FL (ref 9–12.9)
RBC # BLD AUTO: 3.94 M/UL (ref 4.2–5.4)
RH BLD: NORMAL
RUBV AB SER QL: 50.1 IU/ML
T PALLIDUM AB SER QL IA: ABNORMAL
WBC # BLD AUTO: 12.3 K/UL (ref 4.8–10.8)

## 2023-02-09 PROCEDURE — 85025 COMPLETE CBC W/AUTO DIFF WBC: CPT

## 2023-02-09 PROCEDURE — 86900 BLOOD TYPING SEROLOGIC ABO: CPT

## 2023-02-09 PROCEDURE — 86901 BLOOD TYPING SEROLOGIC RH(D): CPT

## 2023-02-09 PROCEDURE — 87086 URINE CULTURE/COLONY COUNT: CPT

## 2023-02-09 PROCEDURE — 86850 RBC ANTIBODY SCREEN: CPT

## 2023-02-09 PROCEDURE — 87389 HIV-1 AG W/HIV-1&-2 AB AG IA: CPT

## 2023-02-09 PROCEDURE — 86592 SYPHILIS TEST NON-TREP QUAL: CPT

## 2023-02-09 PROCEDURE — 87340 HEPATITIS B SURFACE AG IA: CPT

## 2023-02-09 PROCEDURE — 86762 RUBELLA ANTIBODY: CPT

## 2023-02-09 PROCEDURE — 82950 GLUCOSE TEST: CPT

## 2023-02-09 PROCEDURE — 86780 TREPONEMA PALLIDUM: CPT

## 2023-02-11 LAB
BACTERIA UR CULT: NORMAL
SIGNIFICANT IND 70042: NORMAL
SITE SITE: NORMAL
SOURCE SOURCE: NORMAL

## 2023-02-20 ENCOUNTER — HOSPITAL ENCOUNTER (OUTPATIENT)
Facility: MEDICAL CENTER | Age: 33
End: 2023-02-20
Attending: OBSTETRICS & GYNECOLOGY
Payer: MEDICAID

## 2023-02-20 PROCEDURE — 82952 GTT-ADDED SAMPLES: CPT

## 2023-02-20 PROCEDURE — 82951 GLUCOSE TOLERANCE TEST (GTT): CPT

## 2023-02-21 LAB
GLUCOSE 1H P CHAL SERPL-MCNC: 92 MG/DL (ref 65–199)
GLUCOSE 2H P CHAL SERPL-MCNC: 171 MG/DL (ref 65–139)
GLUCOSE 3H P CHAL SERPL-MCNC: 142 MG/DL (ref 65–109)
GLUCOSE 4H P CHAL SERPL-MCNC: ABNORMAL MG/DL (ref 65–99)
GLUCOSE BS SERPL-MCNC: 90 MG/DL (ref 65–99)

## 2023-04-16 ENCOUNTER — HOSPITAL ENCOUNTER (EMERGENCY)
Facility: MEDICAL CENTER | Age: 33
End: 2023-04-16
Attending: OBSTETRICS & GYNECOLOGY | Admitting: OBSTETRICS & GYNECOLOGY
Payer: MEDICAID

## 2023-04-16 VITALS
HEIGHT: 60 IN | TEMPERATURE: 97.9 F | WEIGHT: 189 LBS | OXYGEN SATURATION: 97 % | BODY MASS INDEX: 37.11 KG/M2 | DIASTOLIC BLOOD PRESSURE: 76 MMHG | RESPIRATION RATE: 15 BRPM | HEART RATE: 102 BPM | SYSTOLIC BLOOD PRESSURE: 125 MMHG

## 2023-04-16 LAB
APPEARANCE UR: CLEAR
COLOR UR AUTO: YELLOW
GLUCOSE UR QL STRIP.AUTO: NEGATIVE MG/DL
KETONES UR QL STRIP.AUTO: NEGATIVE MG/DL
LEUKOCYTE ESTERASE UR QL STRIP.AUTO: NEGATIVE
NITRITE UR QL STRIP.AUTO: NEGATIVE
PH UR STRIP.AUTO: 7 [PH] (ref 5–8)
PROT UR QL STRIP: NEGATIVE MG/DL
RBC UR QL AUTO: NEGATIVE
SP GR UR STRIP.AUTO: 1.01 (ref 1–1.03)

## 2023-04-16 PROCEDURE — 59025 FETAL NON-STRESS TEST: CPT

## 2023-04-16 PROCEDURE — 302449 STATCHG TRIAGE ONLY (STATISTIC)

## 2023-04-16 PROCEDURE — 81002 URINALYSIS NONAUTO W/O SCOPE: CPT

## 2023-04-16 RX ORDER — INSULIN GLARGINE 100 [IU]/ML
12 INJECTION, SOLUTION SUBCUTANEOUS EVERY EVENING
Status: ON HOLD | COMMUNITY
End: 2023-04-25

## 2023-04-16 ASSESSMENT — FIBROSIS 4 INDEX: FIB4 SCORE: 0.36

## 2023-04-16 NOTE — PROGRESS NOTES
1253 32 y/o  EDC 23, EGA 37.2, here to L&D with c/o abdominal pain/tightening since 1000 this morning. EFM/TOCO applied, pt states positive FM. Denies vaginal LOF or bleeding. Urine dipstick normal.    1340 Dr. Devlin updated, per MD send patient home    1346 Discharge instructions complete, patient has no further questions at this time

## 2023-04-23 ENCOUNTER — HOSPITAL ENCOUNTER (INPATIENT)
Facility: MEDICAL CENTER | Age: 33
LOS: 2 days | End: 2023-04-25
Attending: OBSTETRICS & GYNECOLOGY | Admitting: OBSTETRICS & GYNECOLOGY
Payer: MEDICAID

## 2023-04-23 ENCOUNTER — ANESTHESIA EVENT (OUTPATIENT)
Dept: OBGYN | Facility: MEDICAL CENTER | Age: 33
End: 2023-04-23
Payer: MEDICAID

## 2023-04-23 ENCOUNTER — ANESTHESIA (OUTPATIENT)
Dept: OBGYN | Facility: MEDICAL CENTER | Age: 33
End: 2023-04-23
Payer: MEDICAID

## 2023-04-23 DIAGNOSIS — G89.18 POSTOPERATIVE PAIN: ICD-10-CM

## 2023-04-23 LAB
ANISOCYTOSIS BLD QL SMEAR: ABNORMAL
BASOPHILS # BLD AUTO: 0.3 % (ref 0–1.8)
BASOPHILS # BLD: 0.03 K/UL (ref 0–0.12)
COMMENT 1642: NORMAL
EOSINOPHIL # BLD AUTO: 0.22 K/UL (ref 0–0.51)
EOSINOPHIL NFR BLD: 2.3 % (ref 0–6.9)
ERYTHROCYTE [DISTWIDTH] IN BLOOD BY AUTOMATED COUNT: 46.5 FL (ref 35.9–50)
HCT VFR BLD AUTO: 31.5 % (ref 37–47)
HGB BLD-MCNC: 9.2 G/DL (ref 12–16)
HOLDING TUBE BB 8507: NORMAL
HYPOCHROMIA BLD QL SMEAR: ABNORMAL
IMM GRANULOCYTES # BLD AUTO: 0.12 K/UL (ref 0–0.11)
IMM GRANULOCYTES NFR BLD AUTO: 1.2 % (ref 0–0.9)
LYMPHOCYTES # BLD AUTO: 2.01 K/UL (ref 1–4.8)
LYMPHOCYTES NFR BLD: 20.7 % (ref 22–41)
MCH RBC QN AUTO: 22.5 PG (ref 27–33)
MCHC RBC AUTO-ENTMCNC: 29.2 G/DL (ref 33.6–35)
MCV RBC AUTO: 77 FL (ref 81.4–97.8)
MICROCYTES BLD QL SMEAR: ABNORMAL
MONOCYTES # BLD AUTO: 0.73 K/UL (ref 0–0.85)
MONOCYTES NFR BLD AUTO: 7.5 % (ref 0–13.4)
MORPHOLOGY BLD-IMP: NORMAL
NEUTROPHILS # BLD AUTO: 6.58 K/UL (ref 2–7.15)
NEUTROPHILS NFR BLD: 68 % (ref 44–72)
NRBC # BLD AUTO: 0.14 K/UL
NRBC BLD-RTO: 1.4 /100 WBC
PLATELET # BLD AUTO: 340 K/UL (ref 164–446)
PLATELET BLD QL SMEAR: NORMAL
PMV BLD AUTO: 10.8 FL (ref 9–12.9)
POLYCHROMASIA BLD QL SMEAR: NORMAL
RBC # BLD AUTO: 4.09 M/UL (ref 4.2–5.4)
RBC BLD AUTO: PRESENT
T PALLIDUM AB SER QL IA: NORMAL
WBC # BLD AUTO: 9.7 K/UL (ref 4.8–10.8)

## 2023-04-23 PROCEDURE — C1755 CATHETER, INTRASPINAL: HCPCS | Performed by: OBSTETRICS & GYNECOLOGY

## 2023-04-23 PROCEDURE — 160041 HCHG SURGERY MINUTES - EA ADDL 1 MIN LEVEL 4: Performed by: OBSTETRICS & GYNECOLOGY

## 2023-04-23 PROCEDURE — 160029 HCHG SURGERY MINUTES - 1ST 30 MINS LEVEL 4: Performed by: OBSTETRICS & GYNECOLOGY

## 2023-04-23 PROCEDURE — 88302 TISSUE EXAM BY PATHOLOGIST: CPT

## 2023-04-23 PROCEDURE — 700101 HCHG RX REV CODE 250: Performed by: STUDENT IN AN ORGANIZED HEALTH CARE EDUCATION/TRAINING PROGRAM

## 2023-04-23 PROCEDURE — 700105 HCHG RX REV CODE 258: Performed by: STUDENT IN AN ORGANIZED HEALTH CARE EDUCATION/TRAINING PROGRAM

## 2023-04-23 PROCEDURE — A9270 NON-COVERED ITEM OR SERVICE: HCPCS | Performed by: STUDENT IN AN ORGANIZED HEALTH CARE EDUCATION/TRAINING PROGRAM

## 2023-04-23 PROCEDURE — 160048 HCHG OR STATISTICAL LEVEL 1-5: Performed by: OBSTETRICS & GYNECOLOGY

## 2023-04-23 PROCEDURE — 700102 HCHG RX REV CODE 250 W/ 637 OVERRIDE(OP): Performed by: OBSTETRICS & GYNECOLOGY

## 2023-04-23 PROCEDURE — 700111 HCHG RX REV CODE 636 W/ 250 OVERRIDE (IP): Performed by: STUDENT IN AN ORGANIZED HEALTH CARE EDUCATION/TRAINING PROGRAM

## 2023-04-23 PROCEDURE — 160009 HCHG ANES TIME/MIN: Performed by: OBSTETRICS & GYNECOLOGY

## 2023-04-23 PROCEDURE — 85025 COMPLETE CBC W/AUTO DIFF WBC: CPT

## 2023-04-23 PROCEDURE — 36415 COLL VENOUS BLD VENIPUNCTURE: CPT

## 2023-04-23 PROCEDURE — 160002 HCHG RECOVERY MINUTES (STAT): Performed by: OBSTETRICS & GYNECOLOGY

## 2023-04-23 PROCEDURE — 86780 TREPONEMA PALLIDUM: CPT

## 2023-04-23 PROCEDURE — 0UT70ZZ RESECTION OF BILATERAL FALLOPIAN TUBES, OPEN APPROACH: ICD-10-PCS | Performed by: OBSTETRICS & GYNECOLOGY

## 2023-04-23 PROCEDURE — 01961 ANES CESAREAN DELIVERY ONLY: CPT | Performed by: STUDENT IN AN ORGANIZED HEALTH CARE EDUCATION/TRAINING PROGRAM

## 2023-04-23 PROCEDURE — 700102 HCHG RX REV CODE 250 W/ 637 OVERRIDE(OP): Performed by: STUDENT IN AN ORGANIZED HEALTH CARE EDUCATION/TRAINING PROGRAM

## 2023-04-23 PROCEDURE — 770002 HCHG ROOM/CARE - OB PRIVATE (112)

## 2023-04-23 PROCEDURE — 160035 HCHG PACU - 1ST 60 MINS PHASE I: Performed by: OBSTETRICS & GYNECOLOGY

## 2023-04-23 PROCEDURE — A9270 NON-COVERED ITEM OR SERVICE: HCPCS | Performed by: OBSTETRICS & GYNECOLOGY

## 2023-04-23 RX ORDER — DIPHENHYDRAMINE HYDROCHLORIDE 50 MG/ML
12.5 INJECTION INTRAMUSCULAR; INTRAVENOUS EVERY 6 HOURS PRN
Status: ACTIVE | OUTPATIENT
Start: 2023-04-23 | End: 2023-04-24

## 2023-04-23 RX ORDER — ACETAMINOPHEN 500 MG
1000 TABLET ORAL ONCE
Status: COMPLETED | OUTPATIENT
Start: 2023-04-23 | End: 2023-04-23

## 2023-04-23 RX ORDER — KETOROLAC TROMETHAMINE 30 MG/ML
30 INJECTION, SOLUTION INTRAMUSCULAR; INTRAVENOUS EVERY 6 HOURS
Status: DISCONTINUED | OUTPATIENT
Start: 2023-04-23 | End: 2023-04-24

## 2023-04-23 RX ORDER — CEFAZOLIN SODIUM 1 G/3ML
2 INJECTION, POWDER, FOR SOLUTION INTRAMUSCULAR; INTRAVENOUS ONCE
Status: DISCONTINUED | OUTPATIENT
Start: 2023-04-23 | End: 2023-04-23 | Stop reason: HOSPADM

## 2023-04-23 RX ORDER — MORPHINE SULFATE 2 MG/ML
INJECTION, SOLUTION INTRAMUSCULAR; INTRAVENOUS PRN
Status: DISCONTINUED | OUTPATIENT
Start: 2023-04-23 | End: 2023-04-23 | Stop reason: SURG

## 2023-04-23 RX ORDER — OXYCODONE HYDROCHLORIDE 5 MG/1
5 TABLET ORAL EVERY 4 HOURS PRN
Status: ACTIVE | OUTPATIENT
Start: 2023-04-23 | End: 2023-04-24

## 2023-04-23 RX ORDER — CEFAZOLIN SODIUM 1 G/3ML
INJECTION, POWDER, FOR SOLUTION INTRAMUSCULAR; INTRAVENOUS PRN
Status: DISCONTINUED | OUTPATIENT
Start: 2023-04-23 | End: 2023-04-23 | Stop reason: SURG

## 2023-04-23 RX ORDER — BISACODYL 10 MG
10 SUPPOSITORY, RECTAL RECTAL PRN
Status: DISCONTINUED | OUTPATIENT
Start: 2023-04-23 | End: 2023-04-25 | Stop reason: HOSPADM

## 2023-04-23 RX ORDER — HYDROMORPHONE HYDROCHLORIDE 1 MG/ML
0.2 INJECTION, SOLUTION INTRAMUSCULAR; INTRAVENOUS; SUBCUTANEOUS
Status: ACTIVE | OUTPATIENT
Start: 2023-04-23 | End: 2023-04-24

## 2023-04-23 RX ORDER — HYDROMORPHONE HYDROCHLORIDE 1 MG/ML
0.2 INJECTION, SOLUTION INTRAMUSCULAR; INTRAVENOUS; SUBCUTANEOUS
Status: DISCONTINUED | OUTPATIENT
Start: 2023-04-23 | End: 2023-04-23 | Stop reason: HOSPADM

## 2023-04-23 RX ORDER — ONDANSETRON 2 MG/ML
4 INJECTION INTRAMUSCULAR; INTRAVENOUS EVERY 6 HOURS PRN
Status: DISCONTINUED | OUTPATIENT
Start: 2023-04-24 | End: 2023-04-25 | Stop reason: HOSPADM

## 2023-04-23 RX ORDER — OXYCODONE HCL 5 MG/5 ML
5 SOLUTION, ORAL ORAL
Status: DISCONTINUED | OUTPATIENT
Start: 2023-04-23 | End: 2023-04-23 | Stop reason: HOSPADM

## 2023-04-23 RX ORDER — EPHEDRINE SULFATE 50 MG/ML
5 INJECTION, SOLUTION INTRAVENOUS
Status: DISCONTINUED | OUTPATIENT
Start: 2023-04-23 | End: 2023-04-23 | Stop reason: HOSPADM

## 2023-04-23 RX ORDER — SODIUM CHLORIDE, SODIUM LACTATE, POTASSIUM CHLORIDE, CALCIUM CHLORIDE 600; 310; 30; 20 MG/100ML; MG/100ML; MG/100ML; MG/100ML
INJECTION, SOLUTION INTRAVENOUS PRN
Status: DISCONTINUED | OUTPATIENT
Start: 2023-04-23 | End: 2023-04-25 | Stop reason: HOSPADM

## 2023-04-23 RX ORDER — HYDROMORPHONE HYDROCHLORIDE 1 MG/ML
0.5 INJECTION, SOLUTION INTRAMUSCULAR; INTRAVENOUS; SUBCUTANEOUS
Status: DISCONTINUED | OUTPATIENT
Start: 2023-04-23 | End: 2023-04-23 | Stop reason: HOSPADM

## 2023-04-23 RX ORDER — IBUPROFEN 800 MG/1
800 TABLET ORAL EVERY 8 HOURS
Status: DISCONTINUED | OUTPATIENT
Start: 2023-04-24 | End: 2023-04-24

## 2023-04-23 RX ORDER — ACETAMINOPHEN 500 MG
1000 TABLET ORAL EVERY 6 HOURS
Status: DISPENSED | OUTPATIENT
Start: 2023-04-23 | End: 2023-04-24

## 2023-04-23 RX ORDER — EPHEDRINE SULFATE 50 MG/ML
10 INJECTION, SOLUTION INTRAVENOUS
Status: ACTIVE | OUTPATIENT
Start: 2023-04-23 | End: 2023-04-24

## 2023-04-23 RX ORDER — MORPHINE SULFATE 0.5 MG/ML
INJECTION, SOLUTION EPIDURAL; INTRATHECAL; INTRAVENOUS
Status: COMPLETED | OUTPATIENT
Start: 2023-04-23 | End: 2023-04-23

## 2023-04-23 RX ORDER — OXYCODONE HYDROCHLORIDE 10 MG/1
10 TABLET ORAL EVERY 4 HOURS PRN
Status: DISCONTINUED | OUTPATIENT
Start: 2023-04-24 | End: 2023-04-25 | Stop reason: HOSPADM

## 2023-04-23 RX ORDER — OXYTOCIN 10 [USP'U]/ML
INJECTION, SOLUTION INTRAMUSCULAR; INTRAVENOUS PRN
Status: DISCONTINUED | OUTPATIENT
Start: 2023-04-23 | End: 2023-04-23 | Stop reason: SURG

## 2023-04-23 RX ORDER — MEPERIDINE HYDROCHLORIDE 25 MG/ML
6.25 INJECTION INTRAMUSCULAR; INTRAVENOUS; SUBCUTANEOUS
Status: DISCONTINUED | OUTPATIENT
Start: 2023-04-23 | End: 2023-04-23 | Stop reason: HOSPADM

## 2023-04-23 RX ORDER — DIPHENHYDRAMINE HCL 25 MG
25 TABLET ORAL EVERY 6 HOURS PRN
Status: DISCONTINUED | OUTPATIENT
Start: 2023-04-24 | End: 2023-04-25 | Stop reason: HOSPADM

## 2023-04-23 RX ORDER — VITAMIN A ACETATE, BETA CAROTENE, ASCORBIC ACID, CHOLECALCIFEROL, .ALPHA.-TOCOPHEROL ACETATE, DL-, THIAMINE MONONITRATE, RIBOFLAVIN, NIACINAMIDE, PYRIDOXINE HYDROCHLORIDE, FOLIC ACID, CYANOCOBALAMIN, CALCIUM CARBONATE, FERROUS FUMARATE, ZINC OXIDE, CUPRIC OXIDE 3080; 12; 120; 400; 1; 1.84; 3; 20; 22; 920; 25; 200; 27; 10; 2 [IU]/1; UG/1; MG/1; [IU]/1; MG/1; MG/1; MG/1; MG/1; MG/1; [IU]/1; MG/1; MG/1; MG/1; MG/1; MG/1
1 TABLET, FILM COATED ORAL
Status: DISCONTINUED | OUTPATIENT
Start: 2023-04-23 | End: 2023-04-25 | Stop reason: HOSPADM

## 2023-04-23 RX ORDER — PHENYLEPHRINE HCL IN 0.9% NACL 0.5 MG/5ML
SYRINGE (ML) INTRAVENOUS PRN
Status: DISCONTINUED | OUTPATIENT
Start: 2023-04-23 | End: 2023-04-23 | Stop reason: SURG

## 2023-04-23 RX ORDER — ACETAMINOPHEN 500 MG
1000 TABLET ORAL EVERY 6 HOURS PRN
Status: DISCONTINUED | OUTPATIENT
Start: 2023-04-27 | End: 2023-04-25 | Stop reason: HOSPADM

## 2023-04-23 RX ORDER — GLYCOPYRROLATE 0.2 MG/ML
INJECTION INTRAMUSCULAR; INTRAVENOUS PRN
Status: DISCONTINUED | OUTPATIENT
Start: 2023-04-23 | End: 2023-04-23 | Stop reason: SURG

## 2023-04-23 RX ORDER — CITRIC ACID/SODIUM CITRATE 334-500MG
30 SOLUTION, ORAL ORAL ONCE
Status: COMPLETED | OUTPATIENT
Start: 2023-04-23 | End: 2023-04-23

## 2023-04-23 RX ORDER — ONDANSETRON 2 MG/ML
4 INJECTION INTRAMUSCULAR; INTRAVENOUS
Status: DISCONTINUED | OUTPATIENT
Start: 2023-04-23 | End: 2023-04-23 | Stop reason: HOSPADM

## 2023-04-23 RX ORDER — OXYCODONE HYDROCHLORIDE 10 MG/1
10 TABLET ORAL EVERY 4 HOURS PRN
Status: DISPENSED | OUTPATIENT
Start: 2023-04-23 | End: 2023-04-24

## 2023-04-23 RX ORDER — SODIUM CHLORIDE, SODIUM GLUCONATE, SODIUM ACETATE, POTASSIUM CHLORIDE AND MAGNESIUM CHLORIDE 526; 502; 368; 37; 30 MG/100ML; MG/100ML; MG/100ML; MG/100ML; MG/100ML
INJECTION, SOLUTION INTRAVENOUS
Status: DISCONTINUED | OUTPATIENT
Start: 2023-04-23 | End: 2023-04-23 | Stop reason: SURG

## 2023-04-23 RX ORDER — DIPHENHYDRAMINE HYDROCHLORIDE 50 MG/ML
12.5 INJECTION INTRAMUSCULAR; INTRAVENOUS
Status: DISCONTINUED | OUTPATIENT
Start: 2023-04-23 | End: 2023-04-23 | Stop reason: HOSPADM

## 2023-04-23 RX ORDER — DIPHENHYDRAMINE HYDROCHLORIDE 50 MG/ML
25 INJECTION INTRAMUSCULAR; INTRAVENOUS EVERY 6 HOURS PRN
Status: DISPENSED | OUTPATIENT
Start: 2023-04-23 | End: 2023-04-24

## 2023-04-23 RX ORDER — HYDRALAZINE HYDROCHLORIDE 20 MG/ML
5 INJECTION INTRAMUSCULAR; INTRAVENOUS
Status: DISCONTINUED | OUTPATIENT
Start: 2023-04-23 | End: 2023-04-23 | Stop reason: HOSPADM

## 2023-04-23 RX ORDER — HALOPERIDOL 5 MG/ML
1 INJECTION INTRAMUSCULAR
Status: DISCONTINUED | OUTPATIENT
Start: 2023-04-23 | End: 2023-04-23 | Stop reason: HOSPADM

## 2023-04-23 RX ORDER — SODIUM CHLORIDE, SODIUM GLUCONATE, SODIUM ACETATE, POTASSIUM CHLORIDE AND MAGNESIUM CHLORIDE 526; 502; 368; 37; 30 MG/100ML; MG/100ML; MG/100ML; MG/100ML; MG/100ML
1500 INJECTION, SOLUTION INTRAVENOUS ONCE
Status: COMPLETED | OUTPATIENT
Start: 2023-04-23 | End: 2023-04-23

## 2023-04-23 RX ORDER — DOCUSATE SODIUM 100 MG/1
100 CAPSULE, LIQUID FILLED ORAL 2 TIMES DAILY PRN
Status: DISCONTINUED | OUTPATIENT
Start: 2023-04-23 | End: 2023-04-25 | Stop reason: HOSPADM

## 2023-04-23 RX ORDER — METOCLOPRAMIDE HYDROCHLORIDE 5 MG/ML
10 INJECTION INTRAMUSCULAR; INTRAVENOUS ONCE
Status: COMPLETED | OUTPATIENT
Start: 2023-04-23 | End: 2023-04-23

## 2023-04-23 RX ORDER — OXYCODONE HYDROCHLORIDE 5 MG/1
5 TABLET ORAL EVERY 4 HOURS PRN
Status: DISCONTINUED | OUTPATIENT
Start: 2023-04-24 | End: 2023-04-25 | Stop reason: HOSPADM

## 2023-04-23 RX ORDER — SODIUM CHLORIDE, SODIUM LACTATE, POTASSIUM CHLORIDE, CALCIUM CHLORIDE 600; 310; 30; 20 MG/100ML; MG/100ML; MG/100ML; MG/100ML
INJECTION, SOLUTION INTRAVENOUS CONTINUOUS
Status: DISCONTINUED | OUTPATIENT
Start: 2023-04-23 | End: 2023-04-25 | Stop reason: HOSPADM

## 2023-04-23 RX ORDER — ONDANSETRON 4 MG/1
4 TABLET, ORALLY DISINTEGRATING ORAL EVERY 6 HOURS PRN
Status: DISCONTINUED | OUTPATIENT
Start: 2023-04-24 | End: 2023-04-25 | Stop reason: HOSPADM

## 2023-04-23 RX ORDER — OXYCODONE HCL 5 MG/5 ML
10 SOLUTION, ORAL ORAL
Status: DISCONTINUED | OUTPATIENT
Start: 2023-04-23 | End: 2023-04-23 | Stop reason: HOSPADM

## 2023-04-23 RX ORDER — DIPHENHYDRAMINE HYDROCHLORIDE 50 MG/ML
25 INJECTION INTRAMUSCULAR; INTRAVENOUS EVERY 6 HOURS PRN
Status: DISCONTINUED | OUTPATIENT
Start: 2023-04-24 | End: 2023-04-25 | Stop reason: HOSPADM

## 2023-04-23 RX ORDER — KETOROLAC TROMETHAMINE 30 MG/ML
INJECTION, SOLUTION INTRAMUSCULAR; INTRAVENOUS PRN
Status: DISCONTINUED | OUTPATIENT
Start: 2023-04-23 | End: 2023-04-23 | Stop reason: SURG

## 2023-04-23 RX ORDER — IBUPROFEN 800 MG/1
800 TABLET ORAL EVERY 8 HOURS PRN
Status: DISCONTINUED | OUTPATIENT
Start: 2023-04-27 | End: 2023-04-24

## 2023-04-23 RX ORDER — ACETAMINOPHEN 500 MG
1000 TABLET ORAL EVERY 6 HOURS
Status: DISCONTINUED | OUTPATIENT
Start: 2023-04-24 | End: 2023-04-25 | Stop reason: HOSPADM

## 2023-04-23 RX ORDER — SIMETHICONE 125 MG
125 TABLET,CHEWABLE ORAL 4 TIMES DAILY PRN
Status: DISCONTINUED | OUTPATIENT
Start: 2023-04-23 | End: 2023-04-25 | Stop reason: HOSPADM

## 2023-04-23 RX ORDER — DEXAMETHASONE SODIUM PHOSPHATE 4 MG/ML
INJECTION, SOLUTION INTRA-ARTICULAR; INTRALESIONAL; INTRAMUSCULAR; INTRAVENOUS; SOFT TISSUE PRN
Status: DISCONTINUED | OUTPATIENT
Start: 2023-04-23 | End: 2023-04-23 | Stop reason: SURG

## 2023-04-23 RX ORDER — ONDANSETRON 2 MG/ML
INJECTION INTRAMUSCULAR; INTRAVENOUS PRN
Status: DISCONTINUED | OUTPATIENT
Start: 2023-04-23 | End: 2023-04-23 | Stop reason: SURG

## 2023-04-23 RX ORDER — HYDROMORPHONE HYDROCHLORIDE 1 MG/ML
0.4 INJECTION, SOLUTION INTRAMUSCULAR; INTRAVENOUS; SUBCUTANEOUS
Status: ACTIVE | OUTPATIENT
Start: 2023-04-23 | End: 2023-04-24

## 2023-04-23 RX ORDER — HYDROMORPHONE HYDROCHLORIDE 1 MG/ML
0.1 INJECTION, SOLUTION INTRAMUSCULAR; INTRAVENOUS; SUBCUTANEOUS
Status: DISCONTINUED | OUTPATIENT
Start: 2023-04-23 | End: 2023-04-23 | Stop reason: HOSPADM

## 2023-04-23 RX ORDER — CALCIUM CARBONATE 500 MG/1
1000 TABLET, CHEWABLE ORAL EVERY 6 HOURS PRN
Status: DISCONTINUED | OUTPATIENT
Start: 2023-04-23 | End: 2023-04-25 | Stop reason: HOSPADM

## 2023-04-23 RX ORDER — ONDANSETRON 2 MG/ML
4 INJECTION INTRAMUSCULAR; INTRAVENOUS EVERY 6 HOURS PRN
Status: ACTIVE | OUTPATIENT
Start: 2023-04-23 | End: 2023-04-24

## 2023-04-23 RX ORDER — BUPIVACAINE HYDROCHLORIDE 7.5 MG/ML
INJECTION, SOLUTION INTRASPINAL
Status: COMPLETED | OUTPATIENT
Start: 2023-04-23 | End: 2023-04-23

## 2023-04-23 RX ADMIN — ACETAMINOPHEN 1000 MG: 500 TABLET, FILM COATED ORAL at 18:21

## 2023-04-23 RX ADMIN — KETOROLAC TROMETHAMINE 30 MG: 30 INJECTION, SOLUTION INTRAMUSCULAR; INTRAVENOUS at 22:44

## 2023-04-23 RX ADMIN — PRENATAL WITH FERROUS FUM AND FOLIC ACID 1 TABLET: 3080; 920; 120; 400; 22; 1.84; 3; 20; 10; 1; 12; 200; 27; 25; 2 TABLET ORAL at 16:41

## 2023-04-23 RX ADMIN — Medication 200 MCG: at 13:28

## 2023-04-23 RX ADMIN — CEFAZOLIN 2 G: 330 INJECTION, POWDER, FOR SOLUTION INTRAMUSCULAR; INTRAVENOUS at 13:21

## 2023-04-23 RX ADMIN — BUPIVACAINE HYDROCHLORIDE IN DEXTROSE 1.4 ML: 7.5 INJECTION, SOLUTION SUBARACHNOID at 13:17

## 2023-04-23 RX ADMIN — FAMOTIDINE 20 MG: 10 INJECTION, SOLUTION INTRAVENOUS at 12:25

## 2023-04-23 RX ADMIN — OXYTOCIN 30 UNITS: 10 INJECTION, SOLUTION INTRAMUSCULAR; INTRAVENOUS at 13:34

## 2023-04-23 RX ADMIN — SODIUM CITRATE AND CITRIC ACID MONOHYDRATE 30 ML: 500; 334 SOLUTION ORAL at 12:23

## 2023-04-23 RX ADMIN — ACETAMINOPHEN 1000 MG: 500 TABLET, FILM COATED ORAL at 12:38

## 2023-04-23 RX ADMIN — Medication 200 MCG: at 13:18

## 2023-04-23 RX ADMIN — SODIUM CHLORIDE, SODIUM GLUCONATE, SODIUM ACETATE, POTASSIUM CHLORIDE AND MAGNESIUM CHLORIDE 1500 ML: 526; 502; 368; 37; 30 INJECTION, SOLUTION INTRAVENOUS at 12:24

## 2023-04-23 RX ADMIN — METOCLOPRAMIDE 10 MG: 5 INJECTION, SOLUTION INTRAMUSCULAR; INTRAVENOUS at 12:25

## 2023-04-23 RX ADMIN — GLYCOPYRROLATE 0.2 MG: 0.2 INJECTION INTRAMUSCULAR; INTRAVENOUS at 13:18

## 2023-04-23 RX ADMIN — ONDANSETRON 4 MG: 2 INJECTION INTRAMUSCULAR; INTRAVENOUS at 13:54

## 2023-04-23 RX ADMIN — MORPHINE SULFATE 2.5 MG: 2 INJECTION, SOLUTION INTRAMUSCULAR; INTRAVENOUS at 13:39

## 2023-04-23 RX ADMIN — SODIUM CHLORIDE, SODIUM GLUCONATE, SODIUM ACETATE, POTASSIUM CHLORIDE AND MAGNESIUM CHLORIDE: 526; 502; 368; 37; 30 INJECTION, SOLUTION INTRAVENOUS at 13:03

## 2023-04-23 RX ADMIN — DEXAMETHASONE SODIUM PHOSPHATE 8 MG: 4 INJECTION, SOLUTION INTRA-ARTICULAR; INTRALESIONAL; INTRAMUSCULAR; INTRAVENOUS; SOFT TISSUE at 13:35

## 2023-04-23 RX ADMIN — MORPHINE SULFATE 300 MCG: 0.5 INJECTION, SOLUTION EPIDURAL; INTRATHECAL; INTRAVENOUS at 13:10

## 2023-04-23 RX ADMIN — KETOROLAC TROMETHAMINE 30 MG: 30 INJECTION, SOLUTION INTRAMUSCULAR; INTRAVENOUS at 16:40

## 2023-04-23 RX ADMIN — MORPHINE SULFATE 2.5 MG: 2 INJECTION, SOLUTION INTRAMUSCULAR; INTRAVENOUS at 13:50

## 2023-04-23 RX ADMIN — KETOROLAC TROMETHAMINE 30 MG: 30 INJECTION, SOLUTION INTRAMUSCULAR at 13:54

## 2023-04-23 RX ADMIN — DIPHENHYDRAMINE HYDROCHLORIDE 25 MG: 50 INJECTION INTRAMUSCULAR; INTRAVENOUS at 16:41

## 2023-04-23 RX ADMIN — Medication 200 MCG: at 13:24

## 2023-04-23 ASSESSMENT — PAIN DESCRIPTION - PAIN TYPE
TYPE: SURGICAL PAIN

## 2023-04-23 ASSESSMENT — LIFESTYLE VARIABLES
ALCOHOL_USE: NO
EVER_SMOKED: YES

## 2023-04-23 ASSESSMENT — PATIENT HEALTH QUESTIONNAIRE - PHQ9
SUM OF ALL RESPONSES TO PHQ9 QUESTIONS 1 AND 2: 0
1. LITTLE INTEREST OR PLEASURE IN DOING THINGS: NOT AT ALL
2. FEELING DOWN, DEPRESSED, IRRITABLE, OR HOPELESS: NOT AT ALL

## 2023-04-23 ASSESSMENT — PAIN SCALES - GENERAL
PAINLEVEL: 0 - NO PAIN
PAIN_LEVEL: 0

## 2023-04-23 ASSESSMENT — FIBROSIS 4 INDEX: FIB4 SCORE: 0.36

## 2023-04-23 NOTE — ANESTHESIA PREPROCEDURE EVALUATION
Case: [id number] Date/Time: 23 1145    Procedure: REPEAT CESEREAN SECTION WITH BILATERAL SALPINGECTOMY    Pre-op diagnosis: MATERNAL CARE FOR LOW TRANSVERSE SACAR FROM PREVIOUS , INPAIRED GLUCOSE TOLERANCE IN PREGNANCY: 38W;2D    Location: [facility] OR 02 / SURGERY LABOR AND DELIVERY    Surgeons: Beata Devlin M.D.          Relevant Problems   No relevant active problems       Physical Exam    Airway   Mallampati: II  TM distance: >3 FB  Neck ROM: full       Cardiovascular - normal exam  Rhythm: regular  Rate: normal  (-) murmur     Dental - normal exam           Pulmonary - normal exam  Breath sounds clear to auscultation     Abdominal    Neurological - normal exam                 Anesthesia Plan    ASA 2       Plan - spinal   Neuraxial block will be primary anesthetic                Postoperative Plan: Postoperative administration of opioids is intended.    Pertinent diagnostic labs and testing reviewed    Informed Consent:    Anesthetic plan and risks discussed with patient.

## 2023-04-23 NOTE — PROGRESS NOTES
1115-Pt care assumed.   1306-In OR.  1333-Delivery of viable female, 8/8 apgars.  1407-In Pacu.  1520-Pt transferred to PP room after uncomplicated surgery and recovery

## 2023-04-23 NOTE — ANESTHESIA POSTPROCEDURE EVALUATION
Patient: Zita Mcintosh    Procedure Summary     Date: 23 Room / Location: [facility] OR 02 / SURGERY LABOR AND DELIVERY    Anesthesia Start: 1303 Anesthesia Stop: 1410    Procedure: REPEAT CESEREAN SECTION WITH BILATERAL SALPINGECTOMY (Abdomen) Diagnosis:        delivery delivered      (same, del)    Surgeons: Beata Devlin M.D. Responsible Provider: Darek Rader M.D.    Anesthesia Type: spinal ASA Status: 2          Final Anesthesia Type: spinal  Last vitals  BP   Blood Pressure: 105/60    Temp   36.7 °C (98 °F)    Pulse   93   Resp   16    SpO2   95 %      Anesthesia Post Evaluation    Patient location during evaluation: PACU  Patient participation: complete - patient participated  Level of consciousness: awake and alert  Pain score: 0    Airway patency: patent  Anesthetic complications: no  Cardiovascular status: hemodynamically stable  Respiratory status: acceptable  Hydration status: euvolemic    PONV: none    patient able to participate, but full recovery from regional anesthesia has not occurred and is not expected within the stipulated timeframe for the completion of the evaluation      No notable events documented.     Nurse Pain Score: 0 (NPRS)

## 2023-04-23 NOTE — OP REPORT
DATE OF SERVICE:  2023     PREOPERATIVE DIAGNOSES:  1.  Term intrauterine pregnancy at 38 and 2/7 weeks.  2.  Prior  section.  3.  A2 gestational diabetes with poor control.  4.  Breech presentation.  5.  Multiparous female, desires sterilization.  6.  Prophylactic removal of fallopian tubes.     POSTOPERATIVE DIAGNOSES:  1.  Term intrauterine pregnancy at 38 and 2/7 weeks.  2.  Prior  section.  3.  A2 gestational diabetes with poor control.  4.  Breech presentation.  5.  Multiparous female, desires sterilization.  6.  Prophylactic removal of fallopian tubes.     PROCEDURES PERFORMED:  1.  Repeat low transverse  section.  2.  Bilateral salpingectomy.     SURGEON:  Beata Devlin MD     ASSISTANT:  Sheree Ryan MD     ANESTHESIA:  Spinal.     ESTIMATED BLOOD LOSS:  400 mL     URINE OUTPUT:  Quantity sufficient clear urine at the end of the procedure.     FINDINGS:  Viable female infant in a sydnie breech presentation with Apgars of   8 and 8, weight of 8 pounds 9 ounces.  Copious amounts of clear amniotic   fluid; however, normal uterus, tubes, and ovaries bilaterally.     DESCRIPTION OF PROCEDURE:  The patient was taken to the operating room where   spinal anesthesia was applied without any complications.  The patient was then   prepped and draped in the usual sterile manner.  A formal time-out was called   and IV antibiotics were given.  A Pfannenstiel incision was made with a knife   down to the rectus fascia.  The rectus fascia was  from the   underlying rectus muscle first superiorly, then inferiorly.  The rectus muscle   was  sharply in the midline.  The peritoneum was tented up and   entered bluntly and the incision was extended with care to avoid injury to   underlying bowel or bladder.  A 4-cm incision was then made in the lower   uterine segment transversely and the incision was extended bluntly.  Upon   entry into the uterine cavity, there was copious  amounts of clear amniotic   fluid.  The infant was in a sydnie breech presentation.  The buttocks was   grasped and delivered through the hysterotomy incision followed by the trunk   of the body.  Once the scapula was seen, the arms were swept forward into the   hysterotomy incision and the head was delivered without any complications.    The mouth and nares were suctioned.  The cord was doubly clamped and cut after   a 30-second delay and the infant was handed off to awaiting neonatology team.    The placenta was then manually extracted.  Fragments of membranes were   removed.  The hysterotomy incision was approximated with 0 chromic in a   running locking stitch x1.  Hemostasis was noted.  The pericolic gutters were   examined, blood clots were removed.  The procedure then went towards the   sterilization with bilateral salpingectomy.  The fallopian tubes were   localized down to the fimbriated ends bilaterally.  The fallopian tube was   then removed from the attachment to the ovary, mesosalpinx, and uterus using   the LigaSure device.  Hemostasis was noted on the right.  The same procedure   was then done on the left.  There was noted to be a slight amount of bleeding   at the ovary, which was cauterized again and hemostasis was noted.  Both tubes   were then sent to pathology intact.  The pericolic gutters were examined   again as well as the hysterotomy incision and both were noted to have no   bleeding.  The muscle and peritoneum was approximated with 3 mattress sutures   of 0 chromic.  The fascia was approximated with #1 Vicryl.  The subcutaneous   fat was irrigated, small bleeders were bovied.  The subcutaneous fat was   approximated with 3 interrupted sutures of #1 Vicryl.  The skin was   approximated with Insorb subcuticular staples.  Prineo mesh was placed over   the incision, followed by Dermabond glue, and Tegaderm was used for dressing.    Sponge, needle, instrument, and lap counts were correct x2.  The  patient   tolerated the procedure well and went to recovery room in stable condition.        ______________________________  MD CASTILLO Ballard/BILL    DD:  04/23/2023 15:50  DT:  04/23/2023 16:53    Job#:  [id number]

## 2023-04-23 NOTE — ANESTHESIA TIME REPORT
[address] [address] 
[phone] Patient: Shelbie Westbrook MRN: [id number] You are allergic to the following Allergen Reactions Latex Contact Dermatitis Pt states he is NOT allergic to latex. Neosporin (Neomycin-Bacitracin-Polymyxin) Rash Recent Documentation Height Weight BMI Smoking Status 1.88 m 66.3 kg 18.76 kg/m2 Former Smoker Emergency Contacts Name Discharge Info Relation Home Work Mobile Batsheva Hough DISCHARGE CAREGIVER [3] Spouse [3] [phone] About your hospitalization You were admitted on:  August 11, 2017 You last received care in the:  [address] You were discharged on:  August 11, 2017 Unit phone number:  [phone] Why you were hospitalized Your primary diagnosis was:  Not on File Providers Seen During Your Hospitalizations Provider Role Specialty Primary office phone Britany Parks MD Attending Provider Vascular Surgery [phone] Your Primary Care Physician (PCP) Primary Care Physician Office Phone Office Fax Yusef  [phone] Follow-up Information Follow up With Details Comments Contact Info Justa Paulino MD   [facility] [address] [facility] [id number] [phone] Your Appointments Tuesday August 29, 2017 11:45 AM EDT Follow Up with Britany Parks MD  
[facility]  
 [address] [place] [address],[address] [facility]  
[phone] Current Discharge Medication List  
  
CONTINUE these medications which have CHANGED Dose & Instructions Dispensing Information Comments Morning Noon Evening Bedtime * PERCOCET 5-325 mg per tablet Generic drug:  oxyCODONE-acetaminophen Anesthesia Start and Stop Event Times     Date Time Event    4/23/2023 1220 Ready for Procedure     1303 Anesthesia Start     1410 Anesthesia Stop        Responsible Staff  04/23/23    Name Role Begin End    Darek Rader M.D. Anesth 1303 1410        Overtime Reason:  no overtime (within assigned shift)    Comments:                                                       What changed:  Another medication with the same name was added. Make sure you understand how and when to take each. Your last dose was: Your next dose is:    
   
   
 Dose:  1 Tab Take 1 Tab by mouth every four (4) hours as needed for Pain. Refills:  0  
     
   
   
   
  
 * oxyCODONE-acetaminophen 5-325 mg per tablet Commonly known as:  PERCOCET What changed: You were already taking a medication with the same name, and this prescription was added. Make sure you understand how and when to take each. Your last dose was: Your next dose is:    
   
   
 Dose:  2 Tab Take 2 Tabs by mouth every six (6) hours as needed. Max Daily Amount: 8 Tabs. Quantity:  50 Tab Refills:  0  
     
   
   
   
  
 * Notice: This list has 2 medication(s) that are the same as other medications prescribed for you. Read the directions carefully, and ask your doctor or other care provider to review them with you. CONTINUE these medications which have NOT CHANGED Dose & Instructions Dispensing Information Comments Morning Noon Evening Bedtime ADVAIR DISKUS 250-50 mcg/dose diskus inhaler Generic drug:  fluticasone-salmeterol Your last dose was: Your next dose is:    
   
   
 Dose:  1 Puff Take 1 Puff by inhalation every twelve (12) hours. Refills:  0  
     
   
   
   
  
 aspirin 81 mg tablet Your last dose was: Your next dose is:    
   
   
 Dose:  81 mg Take 81 mg by mouth daily. Refills:  0  
     
   
   
   
  
 atorvastatin 20 mg tablet Commonly known as:  LIPITOR Your last dose was: Your next dose is: Take  by mouth daily. Refills:  0  
     
   
   
   
  
 cilostazol 100 mg tablet Commonly known as:  PLETAL Your last dose was: Your next dose is:    
   
   
 Dose:  100 mg Take 100 mg by mouth Before breakfast and dinner. Refills:  0 CLARITIN 10 mg tablet Generic drug:  loratadine Your last dose was: Your next dose is:    
   
   
 Dose:  10 mg Take 10 mg by mouth daily. Refills:  0  
     
   
   
   
  
 FLONASE 50 mcg/actuation nasal spray Generic drug:  fluticasone Your last dose was: Your next dose is:    
   
   
 Dose:  2 Spray 2 Sprays by Both Nostrils route daily. Refills:  0  
     
   
   
   
  
 gabapentin 300 mg capsule Commonly known as:  NEURONTIN Your last dose was: Your next dose is:    
   
   
 Dose:  300 mg Take 300 mg by mouth two (2) times a day. One in the am and 2 at hs Refills:  0  
     
   
   
   
  
 iron aspgly,lw-V-K52-FA-Ca-suc [phone] mg-mg-mcg-mg Cap cap Commonly known as:  FERREX [address] Your last dose was: Your next dose is:    
   
   
 Dose:  1 Cap Take 1 Cap by mouth daily. Refills:  0 LACTASE ENZYME PO Your last dose was: Your next dose is:    
   
   
 Dose:  1 Cap Take 1 Cap by mouth daily. Refills:  0  
     
   
   
   
  
 LANTUS 100 unit/mL injection Generic drug:  insulin glargine Your last dose was: Your next dose is:    
   
   
 Dose:  20 Units 20 Units by SubCUTAneous route nightly. Refills:  0  
     
   
   
   
  
 LASIX 40 mg tablet Generic drug:  furosemide Your last dose was: Your next dose is:    
   
   
 Dose:  40 mg Take 40 mg by mouth daily. Refills:  0  
     
   
   
   
  
 levothyroxine 25 mcg tablet Commonly known as:  SYNTHROID Your last dose was: Your next dose is:    
   
   
 Dose:  100 mcg Take 100 mcg by mouth Daily (before breakfast). Refills:  0  
     
   
   
   
  
 lisinopril 2.5 mg tablet Commonly known as:  [place] Your last dose was: Your next dose is: Take  by mouth daily. Refills:  0 metFORMIN 1,000 mg tablet Commonly known as:  GLUCOPHAGE Your last dose was: Your next dose is:    
   
   
 Dose:  1000 mg Take 1,000 mg by mouth two (2) times daily (with meals). Refills:  0  
     
   
   
   
  
 multivitamin, tx-iron-ca-min 9 mg iron-400 mcg Tab tablet Commonly known as:  THERA-M w/ IRON Your last dose was: Your next dose is:    
   
   
 Dose:  1 Tab Take 1 Tab by mouth daily. Refills:  0 NovoLOG Flexpen 100 unit/mL Inpn Generic drug:  insulin aspart Your last dose was: Your next dose is:    
   
   
 by SubCUTAneous route Before breakfast, lunch, and dinner. Sliding scale Refills:  0 Omeprazole delayed release 20 mg tablet Commonly known as:  PRILOSEC D/R Your last dose was: Your next dose is:    
   
   
 Dose:  20 mg Take 20 mg by mouth daily. Refills:  0 PLAVIX 75 mg Tab Generic drug:  clopidogrel Your last dose was: Your next dose is: Take  by mouth. Refills:  0  
     
   
   
   
  
 potassium citrate 10 mEq (1,080 mg) Glenny Tian Commonly known as:  [place] Your last dose was: Your next dose is:    
   
   
 Dose:  10 mEq Take 10 mEq by mouth two (2) times a day. Refills:  0 PROAIR HFA 90 mcg/actuation inhaler Generic drug:  albuterol Your last dose was: Your next dose is: Take  by inhalation. Refills:  0 SPIRIVA WITH HANDIHALER 18 mcg inhalation capsule Generic drug:  tiotropium Your last dose was: Your next dose is:    
   
   
 Dose:  1 Cap Take 1 Cap by inhalation daily. Refills:  0  
     
   
   
   
  
 TIAZAC 360 mg SR capsule Generic drug:  dilTIAZem Your last dose was: Your next dose is:    
   
   
 Dose:  360 mg Take 360 mg by mouth daily. Refills:  0 traMADol 50 mg tablet Commonly known as:  ULTRAM  
   
Your last dose was: Your next dose is:    
   
   
 Dose:  50 mg Take 50 mg by mouth daily. Refills:  0  
     
   
   
   
  
 TYLENOL EXTRA STRENGTH 500 mg tablet Generic drug:  acetaminophen Your last dose was: Your next dose is:    
   
   
 Dose:  500 mg Take 500 mg by mouth every six (6) hours as needed for Pain. Refills:  0  
     
   
   
   
  
 VITAMIN B-12 1,000 mcg/mL injection Generic drug:  cyanocobalamin Your last dose was: Your next dose is:    
   
   
 Dose:  1000 mcg  
1,000 mcg by IntraMUSCular route every thirty (30) days. Refills:  0 Where to Get Your Medications Information on where to get these meds will be given to you by the nurse or doctor. ! Ask your nurse or doctor about these medications  
  oxyCODONE-acetaminophen 5-325 mg per tablet Discharge Instructions [address] Venogram Discharge Instructions General Information: A venogram is a procedure that allows the interventional radiologist to take pictures of the blood flow in the vascular system. A radiology contrast (or dye) is injected into the veins so that the condition of the veins and valves may be visualized. This procedure can be used to diagnose narrowing of the veins or the presence of a blood clot in the deep veins of the body. During this process, a stent, filter or a special intravenous line could be placed. Home Care Instructions: You can resume your regular diet. Do not shower, bathe, swim, drink alcohol, or make any important legal decisions in the next 48 hours. Do not lift anything heavier than a gallon of milk for 5 days. If you take Glucophage (metformin) for diabetes, do not take it for the next 48 hours.  If you were asked to hold any blood thinners prior to the procedure, you may restart that medicine the day after the procedure is completed. Recline your car seat for the ride home. Call If: 
 You should call your Physician and/or the Radiology Nurse if you have any signs of infection; fever, drainage, redness, and/or swelling. If the puncture site should ooze, please call. Also call if you have any pain, decreased sensation, numbness, tingling, swelling, or change in color to the affected extremity. SEEK IMMEDIATE MEDICAL CARE IF YOUR PUNCTURE SITE STARTS TO BLEED. APPLY ENOUGH FIRM PRESSURE TO THE SITE WITH THE TIPS OF YOUR FINGERS TO STOP THE BLEEDING. Follow-Up Instructions:  Please see your ordering doctor as he/she has requested. Sedation for a Medical Procedure: Care Instructions Your Care Instructions For a minor procedure or surgery, you will get a sedative to help you relax. This drug will make you sleepy. It is usually given in a vein (by IV). A shot may also be used to numb the area. If you had local anesthesia, you may feel some pain and discomfort as it wears off. If you have pain, don't be afraid to say so. Pain medicine works better if you take it before the pain gets bad. Common side effects from sedation include: · Feeling sleepy. (Your doctors and nurses will make sure you are not too sleepy to go home.) · Nausea and vomiting. This usually does not last long. · Feeling tired. Follow-up care is a key part of your treatment and safety. Be sure to make and go to all appointments, and call your doctor if you are having problems. It's also a good idea to know your test results and keep a list of the medicines you take. How can you care for yourself at home? Activity · Don't do anything for 24 hours that requires attention to detail. It takes time for the medicine effects to completely wear off.  
· For your safety, you should not drive or operate any machinery that could be dangerous until the medicine wears off and you can think clearly and react easily. · Rest when you feel tired. Getting enough sleep will help you recover. Diet · You can eat your normal diet, unless your doctor gives you other instructions. If your stomach is upset, try clear liquids and bland, low-fat foods like plain toast or rice. · Drink plenty of fluids (unless your doctor tells you not to). · Don't drink alcohol for 24 hours. Medicines · Be safe with medicines. Read and follow all instructions on the label. ¨ If the doctor gave you a prescription medicine for pain, take it as prescribed. ¨ If you are not taking a prescription pain medicine, ask your doctor if you can take an over-the-counter medicine. · If you think your pain medicine is making you sick to your stomach: 
¨ Take your medicine after meals (unless your doctor has told you not to). ¨ Ask your doctor for a different pain medicine. When should you call for help? Call 911 anytime you think you may need emergency care. For example, call if: 
· You have severe trouble breathing. · You passed out (lost consciousness). Call your doctor now or seek immediate medical care if: 
· You have trouble breathing. · You have ongoing or worsening nausea or vomiting. · You have a fever. · You have a new or worse headache. · The medicine is not wearing off and you can't think clearly. Watch closely for changes in your health, and be sure to contact your doctor if: 
· You do not get better as expected. Where can you learn more? Go to http://mariella-kenyon.info/. Enter Z247 in the search box to learn more about \"Sedation for a Medical Procedure: Care Instructions. \" Current as of: August 14, 2016 Content Version: 11.3 © 7327-4033 [employer] Care instructions adapted under license by [employer] (which disclaims liability or warranty for this information).  If you have questions about a medical condition or this instruction, always ask your healthcare professional. [address] any warranty or liability for your use of this information. DISCHARGE SUMMARY from Nurse The following personal items are in your possession at time of discharge: 
 
  
Visual Aid: None PATIENT INSTRUCTIONS: 
 
 
F-face looks uneven A-arms unable to move or move unevenly S-speech slurred or non-existent T-time-call 911 as soon as signs and symptoms begin-DO NOT go Back to bed or wait to see if you get better-TIME IS BRAIN. Warning Signs of HEART ATTACK Call 911 if you have these symptoms: 
? Chest discomfort. Most heart attacks involve discomfort in the center of the chest that lasts more than a few minutes, or that goes away and comes back. It can feel like uncomfortable pressure, squeezing, fullness, or pain. ? Discomfort in other areas of the upper body. Symptoms can include pain or discomfort in one or both arms, the back, neck, jaw, or stomach. ? Shortness of breath with or without chest discomfort. ? Other signs may include breaking out in a cold sweat, nausea, or lightheadedness. Don't wait more than five minutes to call [address]! Fast action can save your life. Calling 911 is almost always the fastest way to get lifesaving treatment. Emergency Medical Services staff can begin treatment when they arrive  up to an hour sooner than if someone gets to the hospital by car. The discharge information has been reviewed with the patient and spouse. The patient and spouse verbalized understanding. Discharge medications reviewed with the patient and spouse and appropriate educational materials and side effects teaching were provided. Discharge Orders None Introducing [facility] SERVICES! Genesis Quinonez introduces [employer] patient portal. Now you can access parts of your medical record, email your doctor's office, and request medication refills online. 1. In your internet browser, go to [email or web address] 2. Click on the First Time User? Click Here link in the Sign In box. You will see the New Member Sign Up page. 3. Enter your [employer] Access Code exactly as it appears below. You will not need to use this code after youve completed the sign-up process. If you do not sign up before the expiration date, you must request a new code. · [employer] Access Code: [email or web address] Expires: 10/3/2017  9:03 AM 
 
4. Enter the last four digits of your Social Security Number (xxxx) and Date of Birth (mm/dd/yyyy) as indicated and click Submit. You will be taken to the next sign-up page. 5. Create a [employer] ID. This will be your [employer] login ID and cannot be changed, so think of one that is secure and easy to remember. 6. Create a [employer] password. You can change your password at any time. 7. Enter your Password Reset Question and Answer. This can be used at a later time if you forget your password. 8. Enter your e-mail address. You will receive e-mail notification when new information is available in [address] 9. Click Sign Up. You can now view and download portions of your medical record. 10. Click the Download Summary menu link to download a portable copy of your medical information. If you have questions, please visit the Frequently Asked Questions section of the [employer] website. Remember, [employer] is NOT to be used for urgent needs. For medical emergencies, dial 911. Now available from your iPhone and Android! General Information Please provide this summary of care documentation to your next provider. Patient Signature:  ____________________________________________________________ Date:  ____________________________________________________________  
  
Gwendloyn Finger Provider Signature:  ____________________________________________________________ Date:  ____________________________________________________________

## 2023-04-23 NOTE — ANESTHESIA PROCEDURE NOTES
Spinal Block    Date/Time: 4/23/2023 1:10 PM  Performed by: Darek Rader M.D.  Authorized by: Darek Rader M.D.     Patient Location:  OR  Start Time:  4/23/2023 1:10 PM  End Time:  4/23/2023 1:17 PM  Reason for Block: primary anesthetic    patient identified, IV checked, site marked, risks and benefits discussed, surgical consent, monitors and equipment checked, pre-op evaluation and timeout performed    Patient Position:  Sitting  Prep: ChloraPrep, patient draped and sterile technique    Monitoring:  Blood pressure, continuous pulse oximetry and heart rate  Approach:  Midline  Location:  L4-5  Injection Technique:  Single-shot  Skin infiltration:  Lidocaine  Strength:  1%  Dose:  3ml  Needle Type:  Pencan  Needle Gauge:  25 G  CSF flowing pre/post injection:  Yes  Sensory Level:  T6   2 attempts

## 2023-04-24 LAB
ERYTHROCYTE [DISTWIDTH] IN BLOOD BY AUTOMATED COUNT: 45.5 FL (ref 35.9–50)
HCT VFR BLD AUTO: 30.7 % (ref 37–47)
HGB BLD-MCNC: 9.2 G/DL (ref 12–16)
MCH RBC QN AUTO: 22.7 PG (ref 27–33)
MCHC RBC AUTO-ENTMCNC: 30 G/DL (ref 33.6–35)
MCV RBC AUTO: 75.6 FL (ref 81.4–97.8)
PATHOLOGY CONSULT NOTE: NORMAL
PLATELET # BLD AUTO: 382 K/UL (ref 164–446)
PMV BLD AUTO: 10.9 FL (ref 9–12.9)
RBC # BLD AUTO: 4.06 M/UL (ref 4.2–5.4)
WBC # BLD AUTO: 15.5 K/UL (ref 4.8–10.8)

## 2023-04-24 PROCEDURE — A9270 NON-COVERED ITEM OR SERVICE: HCPCS | Performed by: OBSTETRICS & GYNECOLOGY

## 2023-04-24 PROCEDURE — 770002 HCHG ROOM/CARE - OB PRIVATE (112)

## 2023-04-24 PROCEDURE — 36415 COLL VENOUS BLD VENIPUNCTURE: CPT

## 2023-04-24 PROCEDURE — A9270 NON-COVERED ITEM OR SERVICE: HCPCS | Performed by: STUDENT IN AN ORGANIZED HEALTH CARE EDUCATION/TRAINING PROGRAM

## 2023-04-24 PROCEDURE — 700102 HCHG RX REV CODE 250 W/ 637 OVERRIDE(OP): Performed by: OBSTETRICS & GYNECOLOGY

## 2023-04-24 PROCEDURE — 700102 HCHG RX REV CODE 250 W/ 637 OVERRIDE(OP): Performed by: STUDENT IN AN ORGANIZED HEALTH CARE EDUCATION/TRAINING PROGRAM

## 2023-04-24 PROCEDURE — 85027 COMPLETE CBC AUTOMATED: CPT

## 2023-04-24 RX ORDER — FERROUS SULFATE 325(65) MG
325 TABLET ORAL 2 TIMES DAILY WITH MEALS
Status: DISCONTINUED | OUTPATIENT
Start: 2023-04-24 | End: 2023-04-25 | Stop reason: HOSPADM

## 2023-04-24 RX ORDER — IBUPROFEN 800 MG/1
800 TABLET ORAL EVERY 8 HOURS PRN
Status: DISCONTINUED | OUTPATIENT
Start: 2023-04-27 | End: 2023-04-25 | Stop reason: HOSPADM

## 2023-04-24 RX ORDER — IBUPROFEN 800 MG/1
800 TABLET ORAL EVERY 8 HOURS
Status: DISCONTINUED | OUTPATIENT
Start: 2023-04-24 | End: 2023-04-25 | Stop reason: HOSPADM

## 2023-04-24 RX ADMIN — OXYCODONE HYDROCHLORIDE 10 MG: 10 TABLET ORAL at 07:58

## 2023-04-24 RX ADMIN — ACETAMINOPHEN 1000 MG: 500 TABLET, FILM COATED ORAL at 00:33

## 2023-04-24 RX ADMIN — FERROUS SULFATE TAB 325 MG (65 MG ELEMENTAL FE) 325 MG: 325 (65 FE) TAB at 17:52

## 2023-04-24 RX ADMIN — IBUPROFEN 800 MG: 800 TABLET, FILM COATED ORAL at 04:56

## 2023-04-24 RX ADMIN — OXYCODONE HYDROCHLORIDE 10 MG: 10 TABLET ORAL at 15:26

## 2023-04-24 RX ADMIN — FERROUS SULFATE TAB 325 MG (65 MG ELEMENTAL FE) 325 MG: 325 (65 FE) TAB at 07:55

## 2023-04-24 RX ADMIN — DOCUSATE SODIUM 100 MG: 100 CAPSULE, LIQUID FILLED ORAL at 20:20

## 2023-04-24 RX ADMIN — IBUPROFEN 800 MG: 800 TABLET, FILM COATED ORAL at 22:15

## 2023-04-24 RX ADMIN — OXYCODONE HYDROCHLORIDE 10 MG: 10 TABLET ORAL at 20:20

## 2023-04-24 RX ADMIN — IBUPROFEN 800 MG: 800 TABLET, FILM COATED ORAL at 14:07

## 2023-04-24 RX ADMIN — PRENATAL WITH FERROUS FUM AND FOLIC ACID 1 TABLET: 3080; 920; 120; 400; 22; 1.84; 3; 20; 10; 1; 12; 200; 27; 25; 2 TABLET ORAL at 07:55

## 2023-04-24 RX ADMIN — ACETAMINOPHEN 1000 MG: 500 TABLET, FILM COATED ORAL at 06:14

## 2023-04-24 RX ADMIN — ACETAMINOPHEN 1000 MG: 500 TABLET, FILM COATED ORAL at 17:54

## 2023-04-24 ASSESSMENT — PAIN DESCRIPTION - PAIN TYPE
TYPE: SURGICAL PAIN

## 2023-04-24 NOTE — DISCHARGE PLANNING
Discharge Planning Assessment Post Partum     Reason for Referral: [facility] of THC [facility]  Address: [address] [place] [address] [address] [facility] [address] [place]or [address]  Phone: [phone]  Type of Living Situation: living with FOB and daughter  Mom Diagnosis: Pregnancy,   Baby Diagnosis: -38.2 weeks  Primary Language: English     Name of Baby: Chandu Johnson (: 23)  Father of the Baby: Dat Johnson   Involved in baby’s care? Yes  Contact Information: [phone]     Prenatal Care: Yes-Dr. Devlin  Mom's PCP: No  PCP listed  PCP for new baby: Dr. Montoya     Support System: FOB  Coping/Bonding between mother & baby: Yes  Source of Feeding: breast feeding  Supplies for Infant: prepared for infant; denies any needs     Mom's Insurance: [employer] Medicaid  Baby Covered on Insurance:Yes  Mother Employed/School: Not currently  Other children in the home/names & ages: 11 month old daughter: Doreen Johnson (: 22)     Financial Hardship/Income: No   Mom's Mental status: alert and oriented  Services used prior to admit: Medicaid and WIC     CPS History: Contacted [facility] and reported birth to Madie Jarvis.  Madie stated the report is information only and they will not be opening up an investigation.  Psychiatric History: No  Domestic Violence History: No  Drug/ETOH History: history of THC.  Infant's UDS is negative     Resources Provided: post partum support and counseling resources, children and family resource list, and diaper bank assistance programs  Referrals Made: diaper bank referral provided      Clearance for Discharge: Infant is cleared to discharge home with parents once medically cleared

## 2023-04-24 NOTE — CARE PLAN
The patient is Stable - Low risk of patient condition declining or worsening    Shift Goals  Clinical Goals: Pain management  Patient Goals: pain control, food    Progress made toward(s) clinical / shift goals:  Pain is managed with scheduled medication.    Patient is not progressing towards the following goals:

## 2023-04-24 NOTE — PROGRESS NOTES
Patient requested to go outside, notified patient she may not leave with IV still in place, patient insisted on removal of IV. Patient educated on the need to keep IV in place, patient verbalized understanding and continued to insist on removal.

## 2023-04-24 NOTE — PROGRESS NOTES
Upon entering patients room, This RN observed patient using vape pen. Educated patient that she cannot vape while inside the hospital. Patient expressed understanding and agreed to put it away and not use it.

## 2023-04-24 NOTE — PROGRESS NOTES
Zita Mcintosh PP day 1 POD 1    Subjective: Abdominal pain. no, ambulating .yes, tolerating liquids .yes, tolerating regular diet .yes, flatus.yes, BM .no, Bleeding .light, voiding .yes,dizziness .no, breast feeding.yes, breast tenderness .no    /84   Pulse 100   Temp 37.6 °C (99.6 °F) (Temporal)   Resp 18   Ht 1.524 m (5')   Wt 88 kg (194 lb)   SpO2 95%   Breast Exam: Tenderness .no, Engourgement .no, Mastitis .no  Abdomen soft, non-tender. BS normal. No masses,  No organomegaly  Incision: dry and intact  Fundus Tenderness:no, Below umbilicus:Yes, firm  Perineumperineum intact  ExtremitiesNormal    Meds:   No current facility-administered medications on file prior to encounter.     Current Outpatient Medications on File Prior to Encounter   Medication Sig Dispense Refill    insulin glargine 100 UNIT/ML Solution Pen-injector injection Inject 12 Units under the skin every evening.      acetaminophen (TYLENOL) 325 MG Tab Take 650 mg by mouth every four hours as needed.      Prenatal MV-Min-Fe Fum-FA-DHA (PRENATAL 1 PO) Take 1 Capsule by mouth every day.         Lab:   Recent Results (from the past 48 hour(s))   Histology Request    Collection Time: 04/23/23  6:21 AM   Result Value Ref Range    Pathology Request Sent to Histo    CBC WITH DIFFERENTIAL    Collection Time: 04/23/23 11:13 AM   Result Value Ref Range    WBC 9.7 4.8 - 10.8 K/uL    RBC 4.09 (L) 4.20 - 5.40 M/uL    Hemoglobin 9.2 (L) 12.0 - 16.0 g/dL    Hematocrit 31.5 (L) 37.0 - 47.0 %    MCV 77.0 (L) 81.4 - 97.8 fL    MCH 22.5 (L) 27.0 - 33.0 pg    MCHC 29.2 (L) 33.6 - 35.0 g/dL    RDW 46.5 35.9 - 50.0 fL    Platelet Count 340 164 - 446 K/uL    MPV 10.8 9.0 - 12.9 fL    Neutrophils-Polys 68.00 44.00 - 72.00 %    Lymphocytes 20.70 (L) 22.00 - 41.00 %    Monocytes 7.50 0.00 - 13.40 %    Eosinophils 2.30 0.00 - 6.90 %    Basophils 0.30 0.00 - 1.80 %    Immature Granulocytes 1.20 (H) 0.00 - 0.90 %    Nucleated RBC 1.40 /100 WBC    Neutrophils  (Absolute) 6.58 2.00 - 7.15 K/uL    Lymphs (Absolute) 2.01 1.00 - 4.80 K/uL    Monos (Absolute) 0.73 0.00 - 0.85 K/uL    Eos (Absolute) 0.22 0.00 - 0.51 K/uL    Baso (Absolute) 0.03 0.00 - 0.12 K/uL    Immature Granulocytes (abs) 0.12 (H) 0.00 - 0.11 K/uL    NRBC (Absolute) 0.14 K/uL    Hypochromia 2+ (A)     Anisocytosis 2+ (A)     Microcytosis 2+ (A)    HOLD BLOOD BANK SPECIMEN (NOT TESTED)    Collection Time: 04/23/23 11:13 AM   Result Value Ref Range    Holding Tube - Bb DONE    PERIPHERAL SMEAR REVIEW    Collection Time: 04/23/23 11:13 AM   Result Value Ref Range    Peripheral Smear Review see below    PLATELET ESTIMATE    Collection Time: 04/23/23 11:13 AM   Result Value Ref Range    Plt Estimation Normal    MORPHOLOGY    Collection Time: 04/23/23 11:13 AM   Result Value Ref Range    RBC Morphology Present     Polychromia 1+    DIFFERENTIAL COMMENT    Collection Time: 04/23/23 11:13 AM   Result Value Ref Range    Comments-Diff see below    T.PALLIDUM AB SERENA (Syphilis)    Collection Time: 04/23/23 11:13 AM   Result Value Ref Range    Syphilis, Treponemal Qual Non-Reactive Non-Reactive   CBC without differential- Once in AM regardless of delivery time    Collection Time: 04/24/23  5:58 AM   Result Value Ref Range    WBC 15.5 (H) 4.8 - 10.8 K/uL    RBC 4.06 (L) 4.20 - 5.40 M/uL    Hemoglobin 9.2 (L) 12.0 - 16.0 g/dL    Hematocrit 30.7 (L) 37.0 - 47.0 %    MCV 75.6 (L) 81.4 - 97.8 fL    MCH 22.7 (L) 27.0 - 33.0 pg    MCHC 30.0 (L) 33.6 - 35.0 g/dL    RDW 45.5 35.9 - 50.0 fL    Platelet Count 382 164 - 446 K/uL    MPV 10.9 9.0 - 12.9 fL       Assessment and Plan  normal postpartum course  No heavy bleeding or foul vaginal discharge     Continue Routine postpartum care  Ambulate  Iron

## 2023-04-24 NOTE — PROGRESS NOTES
Assumed patient care at 0700 and received bedside report from RN. Patient alert and oriented and denies pain or discomfort. Patient ambulating ad jeremy. Patient is tolerating diet. Plan of care discussed, education provided on all administered medications, and hourly rounding in place.

## 2023-04-24 NOTE — PROGRESS NOTES
1850- Bedside report received. VSS.   1955- Patient assessment completed. Reviewed POC for the night and answered all questions. Call light within reach.

## 2023-04-24 NOTE — LACTATION NOTE
This note was copied from a baby's chart.  Mom is a 34 y/o P2 who delivered baby girl weighing 8 # 8.5 oz at 38.2 wks. Mom reports she only breast fed her first child in hospital. Baby is 11 months old. Mom has CPS case for first baby and drug use.  This baby 's tox screen was negative.  Mom does state she smoked during pregnancy and is aware of the risks of smoking and breastfeeding or around the baby in the home.     LC was called to room for assist with latch. Mom has attempted to breast feed and was successful at 0500 a.m and offered a spoon feed at 11:00 when baby would not latch.   Baby was in the nursery when mom left the floor with a family member. This is not the first time mom has left the floor and placed baby in the NBN.     A pacifier was given and mom states now the baby won't latch. Baby is frantic and has aversion behaviors. Baby attempts to latch but is disorganized. Baby was calmed with the pacifier and and when he had a rhythmic suckle pattern, LC switched baby onto the breast. Baby did latch but would pull off and cry and become disorganized again and rooting all over the breast with latching.  Mom said she thinks baby is hungry and was going to offer a bottle as well. LC provided mother with Similac formula and taught how to slowly give to baby. LC kept baby at the breast and attempted to relatch after 5 mls and baby was calmer. Baby once again did latch but pulled off after several and got frustrated. LC then gave baby 10 mls and settled baby in bassinet on his back after burping and swaddling. LC encouraged mother to offer baby the breast if that is her desire and discussed calling for help.   DAMON recommended spending more time STS with baby  Mom has pump at home and is enrolled in WIC

## 2023-04-24 NOTE — CARE PLAN
The patient is Stable - Low risk of patient condition declining or worsening    Shift Goals  Clinical Goals: pain management  Patient Goals: pain control    Progress made toward(s) clinical / shift goals:    Problem: Pain - Standard  Goal: Alleviation of pain or a reduction in pain to the patient’s comfort goal  Outcome: Progressing  Note: Educated patient on pain rating scales, frequent pain assessments in place, medicating per MAR, non pharmaceutical pain relief such as heat pads and positioning in use.         Problem: Knowledge Deficit - Postpartum  Goal: Patient will verbalize and demonstrate understanding of self and infant care  Outcome: Progressing  Note: Educated provided to MOB on infant cares, patient expressed understanding. All questions answered.        Patient is not progressing towards the following goals:

## 2023-04-24 NOTE — PROGRESS NOTES
Assumed patient care. Pt is resting comfortably with baby in crib. Pt rates pain 5/10 and has been medicated. Patient assessment completed. Discussed infant bulb suction and emergency call light. POC reviewed with patient all questions answered. Will continue to monitor, call light in reach.

## 2023-04-24 NOTE — CARE PLAN
The patient is Stable - Low risk of patient condition declining or worsening    Shift Goals  Clinical Goals: VSS, lochia light, fundus firm  Patient Goals: pain control, food    Progress made toward(s) clinical / shift goals:  pt medicated for pain per order.      Problem: Pain  Goal: Patient's pain will be alleviated or reduced to the patient’s comfort goal  Outcome: Progressing       Patient is not progressing towards the following goals:

## 2023-04-25 ENCOUNTER — PHARMACY VISIT (OUTPATIENT)
Dept: PHARMACY | Facility: MEDICAL CENTER | Age: 33
End: 2023-04-25
Payer: COMMERCIAL

## 2023-04-25 VITALS
HEIGHT: 60 IN | WEIGHT: 194 LBS | DIASTOLIC BLOOD PRESSURE: 79 MMHG | HEART RATE: 94 BPM | TEMPERATURE: 98.2 F | OXYGEN SATURATION: 97 % | RESPIRATION RATE: 16 BRPM | SYSTOLIC BLOOD PRESSURE: 126 MMHG | BODY MASS INDEX: 38.09 KG/M2

## 2023-04-25 PROCEDURE — RXMED WILLOW AMBULATORY MEDICATION CHARGE: Performed by: OBSTETRICS & GYNECOLOGY

## 2023-04-25 PROCEDURE — 700102 HCHG RX REV CODE 250 W/ 637 OVERRIDE(OP): Performed by: OBSTETRICS & GYNECOLOGY

## 2023-04-25 PROCEDURE — A9270 NON-COVERED ITEM OR SERVICE: HCPCS | Performed by: OBSTETRICS & GYNECOLOGY

## 2023-04-25 RX ORDER — IBUPROFEN 800 MG/1
800 TABLET ORAL EVERY 8 HOURS PRN
Qty: 30 TABLET | Refills: 1 | Status: SHIPPED | OUTPATIENT
Start: 2023-04-27 | End: 2023-05-12 | Stop reason: SDUPTHER

## 2023-04-25 RX ORDER — OXYCODONE HYDROCHLORIDE AND ACETAMINOPHEN 5; 325 MG/1; MG/1
1 TABLET ORAL EVERY 4 HOURS PRN
Qty: 30 TABLET | Refills: 0 | Status: SHIPPED | OUTPATIENT
Start: 2023-04-25 | End: 2023-05-02

## 2023-04-25 RX ORDER — PSEUDOEPHEDRINE HCL 30 MG
100 TABLET ORAL 2 TIMES DAILY PRN
Qty: 60 CAPSULE | Refills: 1 | Status: SHIPPED | OUTPATIENT
Start: 2023-04-25 | End: 2023-05-12

## 2023-04-25 RX ADMIN — ACETAMINOPHEN 1000 MG: 500 TABLET, FILM COATED ORAL at 05:49

## 2023-04-25 RX ADMIN — ACETAMINOPHEN 1000 MG: 500 TABLET, FILM COATED ORAL at 12:26

## 2023-04-25 RX ADMIN — IBUPROFEN 800 MG: 800 TABLET, FILM COATED ORAL at 14:19

## 2023-04-25 RX ADMIN — ACETAMINOPHEN 1000 MG: 500 TABLET, FILM COATED ORAL at 00:27

## 2023-04-25 RX ADMIN — IBUPROFEN 800 MG: 800 TABLET, FILM COATED ORAL at 05:50

## 2023-04-25 RX ADMIN — OXYCODONE 5 MG: 5 TABLET ORAL at 08:37

## 2023-04-25 RX ADMIN — OXYCODONE HYDROCHLORIDE 10 MG: 10 TABLET ORAL at 04:33

## 2023-04-25 RX ADMIN — PRENATAL WITH FERROUS FUM AND FOLIC ACID 1 TABLET: 3080; 920; 120; 400; 22; 1.84; 3; 20; 10; 1; 12; 200; 27; 25; 2 TABLET ORAL at 07:33

## 2023-04-25 RX ADMIN — FERROUS SULFATE TAB 325 MG (65 MG ELEMENTAL FE) 325 MG: 325 (65 FE) TAB at 07:32

## 2023-04-25 ASSESSMENT — EDINBURGH POSTNATAL DEPRESSION SCALE (EPDS)
I HAVE BEEN ANXIOUS OR WORRIED FOR NO GOOD REASON: HARDLY EVER
I HAVE LOOKED FORWARD WITH ENJOYMENT TO THINGS: AS MUCH AS I EVER DID
I HAVE BLAMED MYSELF UNNECESSARILY WHEN THINGS WENT WRONG: NOT VERY OFTEN
I HAVE BEEN ABLE TO LAUGH AND SEE THE FUNNY SIDE OF THINGS: AS MUCH AS I ALWAYS COULD
I HAVE FELT SAD OR MISERABLE: NO, NOT AT ALL
I HAVE BEEN SO UNHAPPY THAT I HAVE BEEN CRYING: ONLY OCCASIONALLY
I HAVE BEEN SO UNHAPPY THAT I HAVE HAD DIFFICULTY SLEEPING: NOT VERY OFTEN
THINGS HAVE BEEN GETTING ON TOP OF ME: NO, MOST OF THE TIME I HAVE COPED QUITE WELL
I HAVE FELT SCARED OR PANICKY FOR NO GOOD REASON: NO, NOT MUCH
THE THOUGHT OF HARMING MYSELF HAS OCCURRED TO ME: NEVER

## 2023-04-25 ASSESSMENT — PAIN DESCRIPTION - PAIN TYPE
TYPE: SURGICAL PAIN

## 2023-04-25 NOTE — PROGRESS NOTES
2020 Pt doing well bonding with baby, Assessment done fundus firm with scant lochia, abdomen soft non distended, up to bathroom and voiding without difficulty, incision covered with Transparent Film clean and dry, Plan of care rendered, Complained of mild to moderate abdominal pain medicated her as per doctor orders, Encourage more ambulation, Needs attended.

## 2023-04-25 NOTE — CARE PLAN
Problem: Altered Physiologic Condition  Goal: Patient physiologically stable as evidenced by normal lochia, palpable uterine involution and vitals within normal limits  Outcome: Progressing     Problem: Bowel Elimination - Post Surgical  Goal: Patient will resume regular bowel sounds and function with no discomfort or distention  Outcome: Progressing   The patient is Stable - Low risk of patient condition declining or worsening    Shift Goals: pain controlled, ambulation, rest  Clinical Goals: pain management  Patient Goals: pain controlled, ambulation, rest    Progress made toward(s) clinical / shift goals:  pt verbalized acceptable level of pain    Patient is not progressing towards the following goals:

## 2023-04-25 NOTE — DISCHARGE SUMMARY
Discharge Summary:      Zita Mcintosh    Admit Date:   2023  Discharge Date:  2023     Admitting diagnosis:  Labor and delivery indication for care or intervention [O75.9]  Discharge Diagnosis: Status post  for breech, repeat, A2GDM.  Pregnancy Complications: gestational diabetes  Tubal Ligation:  yes     History:  Past Medical History:   Diagnosis Date    Diabetes (HCC)     gestational    Kidney stone     Marijuana use     Pyelonephritis     Smoker     UTI (urinary tract infection)      OB History    Para Term  AB Living   2 1 1     1   SAB IAB Ectopic Molar Multiple Live Births           0 1      # Outcome Date GA Lbr Mendel/2nd Weight Sex Delivery Anes PTL Lv   2 Term 23 38w2d  3.87 kg (8 lb 8.5 oz) F CS-LTranv Spinal N ALINA   1               Allergies: Azithromycin    Hospital Course:   33 y.o. , now para 2, was admitted with the above mentioned diagnosis, underwent Repeat  breech, repeat, no complications. Patient postpartum course was unremarkable, with progressive advancement in diet , ambulation and toleration of oral analgesia. Patient without complaints today and desires discharge.      Vitals:    23 0600 23 1009 23 1817 23 0518   BP: 131/84 136/84 (!) 128/94 126/79   Pulse: 100 85 94 94   Resp: 18 20 18 16   Temp: 37.6 °C (99.6 °F) 36.4 °C (97.5 °F) 36.8 °C (98.2 °F) 36.8 °C (98.2 °F)   TempSrc: Temporal Temporal Temporal Temporal   SpO2: 95% 96% 97% 97%   Weight:       Height:           Exam:  Breast Exam: negative  Abdomen: Abdomen soft, non-tender. BS normal. No masses,  No organomegaly  Fundus Non Tender: yes  Incision: dry and intact  Perineum: perineum intact  Extremity: extremities, peripheral pulses and reflexes normal     Labs:  Recent Labs     23  1113 23  0558   WBC 9.7 15.5*   RBC 4.09* 4.06*   HEMOGLOBIN 9.2* 9.2*   HEMATOCRIT 31.5* 30.7*   MCV 77.0* 75.6*   MCH 22.5* 22.7*   MCHC 29.2* 30.0*    RDW 46.5 45.5   PLATELETCT 340 382   MPV 10.8 10.9        Activity:   Discharge to home  Pelvic Rest x 6 weeks    Assessment:  normal postpartum course  Discharge Assessment: No areas of skin breakdown/redness; surgical incision intact/healing     Follow up: .2 week for incision check.      Discharge Meds:   Current Outpatient Medications   Medication Sig Dispense Refill    docusate sodium 100 MG Cap Take 100 mg by mouth 2 times a day as needed for Constipation. 60 Capsule 1    [START ON 4/27/2023] ibuprofen (MOTRIN) 800 MG Tab Take 1 Tablet by mouth every 8 hours as needed for Mild Pain or Moderate Pain. 30 Tablet 1    oxyCODONE-acetaminophen (PERCOCET) 5-325 MG Tab Take 1 Tablet by mouth every four hours as needed for Moderate Pain or Severe Pain for up to 7 days. 30 Tablet 0       Beata Devlin M.D.

## 2023-04-25 NOTE — DISCHARGE INSTRUCTIONS
PATIENT DISCHARGE EDUCATION INSTRUCTION SHEET    REASONS TO CALL YOUR OBSTETRICIAN  Persistent fever, shaking, chills (Temperature higher than 100.4) may indicate you have an infection  Heavy bleeding: soaking more than 1 pad per hour; Passing clots an egg-sized clot or bigger may mean you have an postpartum hemorrhage  Foul odor from vagina or bad smelling or discolored discharge or blood  Breast infection (Mastitis symptoms); breast pain, chills, fever, redness or red streaks, may feel flu like symptoms  Urinary pain, burning or frequency  Incision that is not healing, increased redness, swelling, tenderness or pain, or any pus from episiotomy or  site may mean you have an infection  Redness, swelling, warmth, or painful to touch in the calf area of your leg may mean you have a blood clot  Severe or intensified depression, thoughts or feelings of wanting to hurt yourself or someone else   Pain in chest, obstructed breathing or shortness of breath (trouble catching your breath) may mean you are having a postpartum complication. Call your provider immediately   Headache that does not get better, even after taking medicine, a bad headache with vision changes or pain in the upper right area of your belly may mean you have high blood pressure or post birth preeclampsia. Call your provider immediately    HAND WASHING  All family and friends should wash their hands:  Before and after holding the baby  Before feeding the baby  After using the restroom or changing the baby's diaper    WOUND CARE  Ask your physician for additional care instructions. In general:   Incision:  May shower and pat incision dry   Keep the incision clean and dry  There should not be any opening or pus from the incision  Continue to walk at home 3 times a day   Do NOT lift anything heavier than your baby (over 10 pounds)  Encourage family to help participate in care of the  to allow rest and mom time to heal    VAGINAL CARE  "AND BLEEDING  Nothing inside vagina for 6 weeks:   No sexual intercourse, tampons or douching  Bleeding may continue for 2-4 weeks. Amount and color may vary  Soaking 1 pad or more in an hour for several hours is considered heavy bleeding  Passing large egg sized blood clots can be concerning  If you feel like you have heavy bleeding or are having increasing amount of blood clots call your Obstetrician immediately  If you begin feeling faint upon standing, feeling sick to your stomach, have clammy skin, a really fast heartbeat, have chills, start feeling confused, dizzy, sleepy or weak, or feeling like you're going to faint call your Obstetrician immediately    HYPERTENSION   Preeclampsia or gestational hypertension are types of high blood pressure that only pregnant women can get. It is important for you to be aware of symptoms to seek early intervention and treatment. If you have any of these symptoms immediately call your Obstetrician    Vision changes or blurred vision   Severe headache or pain that is unrelieved with medication and will not go away  Persistent pain in upper abdomen or shoulder   Increased swelling of face, feet, or hands  Difficulty breathing or shortness of breath at rest  Urinating less than usual    URINATION AND BOWEL MOVEMENTS  Eating more fiber (bran cereal, fruits, and vegetables) and drinking plenty of fluids will help to avoid constipation  Urinary frequency and urgency after childbirth is normal  If you experience any urinary pain, burning or frequency call your provider    BIRTH CONTROL  It is possible to become pregnant at any time after delivery and while breastfeeding  Plan to discuss a method of birth control with your physician at your post delivery follow up visit    POSTPARTUM BLUES  During the first few days after birth, you may experience a sense of the \"blues\" which may include impatience, irritability or even crying. These feelings come and go quickly. However, as many as " "1 in 10 women experience emotional symptoms known as postpartum depression.     POSTPARTUM DEPRESSION  May start as early as the second or third day after delivery or take several weeks or months to develop. Symptoms of \"blues\" are present, but are more intense: Crying spells; loss of appetite; feelings of hopelessness or loss of control; fear of touching the baby; over concern or no concern at all about the baby; little or no concern about your own appearance/caring for yourself; and/or inability to sleep or excessive sleeping. Contact your Obstetrician if you are experiencing any of these symptoms     PREVENTING SHAKEN BABY  If you are angry or stressed, PUT THE BABY IN THE CRIB, step away, take some deep breaths, and wait until you are calm to care for the baby. DO NOT SHAKE THE BABY. You are not alone, call a supporter for help.  [facility] 24/7 crisis call line (890-348-7654) or (1-683.659.4284)  You can also text them, text \"ANSWER\" (435770)  "

## 2023-04-25 NOTE — DISCHARGE PLANNING
Meds-to-Beds: Discharge prescription orders listed below delivered to patient's bedside. RN Danni notified. Patient counseled.       Current Outpatient Medications   Medication Sig Dispense Refill    docusate sodium 100 MG Cap Take 1 capsule by mouth 2 times a day as needed for Constipation. 60 Capsule 1    [START ON 4/27/2023] ibuprofen (MOTRIN) 800 MG Tab Take 1 Tablet by mouth every 8 hours as needed for Mild Pain or Moderate Pain. 30 Tablet 1    oxyCODONE-acetaminophen (PERCOCET) 5-325 MG Tab Take 1 Tablet by mouth every four hours as needed for Moderate Pain or Severe Pain for up to 7 days. 30 Tablet 0      Patricia Rizo, PharmD

## 2023-04-25 NOTE — PROGRESS NOTES
8390- Bedside report received from NADJA Padilla.  Patient denied needs.  Patient given heat packs for c/o pain.  Patient informed when next dose of pain medication is available.  0930- Patient ambulated in hallways to [facility].  Patient ambulated with steady gait and tolerated well.  1050- Patient assessment done.  Patient reported she is voiding without difficulty and passing flatus.  Patient denied dizziness and reported that she is walking without difficulty.  Discussed pain management plan, to include MD orders for scheduled and prn pain medications.  Reviewed plan of care.  Patient verbalized understanding. Patient stated desire for discharge home today and was encouraged to read the written patient discharge education/instruction sheet.   1515- Patient stated she read the written patient discharge education/instruction sheet and has no questions.  Discharge instructions reviewed with patient who verbalized understanding and stated she has no questions.  1540- Patient stated that she is ready for discharge.  Patient declined offer for wheelchair escort.  Patient discharged to home, no change noted in condition, with infant and FOB.

## 2023-04-25 NOTE — CARE PLAN
The patient is Stable - Low risk of patient condition declining or worsening    Shift Goals  Clinical Goals: Maintain fundus firm, lochia light; discharge home  Patient Goals: discharge    Progress made toward(s) clinical / shift goals:  MET

## 2023-05-12 ENCOUNTER — OFFICE VISIT (OUTPATIENT)
Dept: MEDICAL GROUP | Facility: MEDICAL CENTER | Age: 33
End: 2023-05-12
Attending: FAMILY MEDICINE
Payer: MEDICAID

## 2023-05-12 VITALS
RESPIRATION RATE: 16 BRPM | WEIGHT: 164 LBS | SYSTOLIC BLOOD PRESSURE: 118 MMHG | TEMPERATURE: 98.1 F | HEIGHT: 60 IN | HEART RATE: 92 BPM | OXYGEN SATURATION: 97 % | BODY MASS INDEX: 32.2 KG/M2 | DIASTOLIC BLOOD PRESSURE: 68 MMHG

## 2023-05-12 DIAGNOSIS — G89.18 POSTOPERATIVE PAIN: ICD-10-CM

## 2023-05-12 DIAGNOSIS — F17.219 CIGARETTE NICOTINE DEPENDENCE WITH NICOTINE-INDUCED DISORDER: ICD-10-CM

## 2023-05-12 DIAGNOSIS — F19.11 HISTORY OF DRUG ABUSE IN REMISSION (HCC): ICD-10-CM

## 2023-05-12 DIAGNOSIS — G57.01 PIRIFORMIS SYNDROME OF RIGHT SIDE: ICD-10-CM

## 2023-05-12 PROCEDURE — 99213 OFFICE O/P EST LOW 20 MIN: CPT | Performed by: FAMILY MEDICINE

## 2023-05-12 PROCEDURE — 99204 OFFICE O/P NEW MOD 45 MIN: CPT | Performed by: FAMILY MEDICINE

## 2023-05-12 PROCEDURE — 3078F DIAST BP <80 MM HG: CPT | Performed by: FAMILY MEDICINE

## 2023-05-12 PROCEDURE — 3074F SYST BP LT 130 MM HG: CPT | Performed by: FAMILY MEDICINE

## 2023-05-12 RX ORDER — IBUPROFEN 800 MG/1
800 TABLET ORAL EVERY 8 HOURS PRN
Qty: 30 TABLET | Refills: 1 | Status: SHIPPED | OUTPATIENT
Start: 2023-05-12

## 2023-05-12 ASSESSMENT — FIBROSIS 4 INDEX: FIB4 SCORE: 0.35

## 2023-05-12 NOTE — PROGRESS NOTES
Subjective:     CC:    Chief Complaint   Patient presents with    Establish Care       HISTORY OF THE PRESENT ILLNESS: Patient is a 33 y.o. female. This pleasant patient is here today to establish primary care with me and discuss the following issues.   Denies any routine adult primary care    Specialists   Ob/Gyn    Saw OB yesterday for post-op check. Has another appointment 4 weeks for follow up.    Piriformis syndrome of right side  Reports pain since delivery, feels like a deep pain in her buttock  Feels pain down the back of the thigh tocalf   Increased pain after prolonged sitting  Reduced range of motion of the hip joint and has been alternating her gait to compensate      Allergies: Azithromycin      [id number] - [place], [place] - [address]  [address]  [place] [address]  Phone: [phone] Fax: [phone]    [facility] - [place], [place] - [address]  [address] [facility] [address]  Phone: [phone] Fax: [phone]      Current Outpatient Medications   Medication Sig Dispense Refill    ibuprofen (MOTRIN) 800 MG Tab Take 1 Tablet by mouth every 8 hours as needed for Mild Pain or Moderate Pain. 30 Tablet 1     No current facility-administered medications for this visit.       Past Medical History:   Diagnosis Date    Diabetes (HCC)     gestational    Kidney stone     Labor and delivery indication for care or intervention 4/23/2023    Marijuana use     Pyelonephritis     Smoker     UTI (urinary tract infection)        Past Surgical History:   Procedure Laterality Date    REPEAT C SECTOIN WITH SALPINGECTOMY N/A 04/23/2023    Procedure: REPEAT CESEREAN SECTION WITH BILATERAL SALPINGECTOMY;  Surgeon: Beata Devlin M.D.;  Location: SUR[facility]ERY LAB[facility] DELIVERY;  Service: Labor and Delivery    TUBAL COAGULATION LAPAROSCOPIC BILATERAL  04/23/2023    PRIMARY C SECTION         Social History     Tobacco Use    Smoking status: Every Day     Packs/day: 1.00     Types: Cigarettes    Smokeless  tobacco: Never    Tobacco comments:     Started smoking age 13; on avg smokes 1 ppd   Vaping Use    Vaping Use: Never used   Substance Use Topics    Alcohol use: Not Currently     Alcohol/week: 0.5 oz     Types: 1 Cans of beer per week    Drug use: Yes     Types: Marijuana, Inhaled, IV     Comment: THC 1/2023; 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 [age]       Family History   Problem Relation Age of Onset    Cancer Mother         lung cancer    Lung Disease Mother         copd    Psychiatric Illness Father         epilepsy       Objective:     /68   Pulse 92   Temp 36.7 °C (98.1 °F)   Resp 16   Ht 1.524 m (5')   Wt 74.4 kg (164 lb)   SpO2 97%   Breastfeeding Unknown   BMI 32.03 kg/m²   Physical Exam  Musculoskeletal:        Back:       Comments: Tenderness to deep palpation  Antalgic gait  5/5 strength and sensation to bilateral LE        Constitutional: Alert, no distress  Psych: Alert and oriented x3, normal affect and mood      Assessment & Plan:   33 y.o. female with the following -    1. Cigarette nicotine dependence with nicotine-induced disorder   Piriformis syndrome of right side  Ongoing since recent delivery from 4/23/23 and persistent, no worrisome symptoms for instability, infection; or systemic joint process. Also possibly sciatica and soft tissue pain.   [X] Discussed use of Motrin PRN for pain  [X] Discussed rest, ice, elevation  [X] Physical therapy consult placed   [X] Recommended avoiding positions that causes pain  [X] Recommended against exercising through pain, but may exercise otherwise  [X] Follow up in one month or earlier if symptoms worsen  [] Handout provided with stretching and strengthening techniques  [] Sports medicine consult for additional treatment techniques  - Referral to Sports Medicine  - Referral to Physical Therapy    2. Postoperative pain  - ibuprofen (MOTRIN) 800 MG Tab; Take 1 Tablet by mouth every 8 hours as needed for Mild Pain or Moderate Pain.  Dispense: 30  Tablet; Refill: 1    3.Cigarette nicotine dependence with nicotine-induced disorder  - Detailed discussion on importance of lifestyle and smoking cessation to prevent future disease. Encouraged smoking cessation and will reassess at next. Patient daily smoker; not yet ready to quit.     Return in about 6 months (around 11/12/2023) for chronic condition follow up, routine annual wellness exam.    Please note that this dictation was created using voice recognition software. I have made every reasonable attempt to correct obvious errors, but I expect that there are errors of grammar and possibly content that I did not discover before finalizing the note.

## 2023-05-12 NOTE — ASSESSMENT & PLAN NOTE
Reports pain since delivery, feels like a deep pain in her buttock  Feels pain down the back of the thigh tocalf   Increased pain after prolonged sitting  Reduced range of motion of the hip joint and has been alternating her gait to compensate

## 2023-07-13 ENCOUNTER — APPOINTMENT (OUTPATIENT)
Dept: PHYSICAL THERAPY | Facility: REHABILITATION | Age: 33
End: 2023-07-13
Attending: FAMILY MEDICINE
Payer: MEDICAID

## 2023-08-03 ENCOUNTER — APPOINTMENT (OUTPATIENT)
Dept: PHYSICAL THERAPY | Facility: REHABILITATION | Age: 33
End: 2023-08-03
Attending: FAMILY MEDICINE
Payer: MEDICAID

## 2023-08-10 ENCOUNTER — APPOINTMENT (OUTPATIENT)
Dept: PHYSICAL THERAPY | Facility: REHABILITATION | Age: 33
End: 2023-08-10
Attending: FAMILY MEDICINE
Payer: MEDICAID

## 2023-08-17 ENCOUNTER — APPOINTMENT (OUTPATIENT)
Dept: PHYSICAL THERAPY | Facility: REHABILITATION | Age: 33
End: 2023-08-17
Attending: FAMILY MEDICINE
Payer: MEDICAID

## 2023-08-24 ENCOUNTER — APPOINTMENT (OUTPATIENT)
Dept: PHYSICAL THERAPY | Facility: REHABILITATION | Age: 33
End: 2023-08-24
Attending: FAMILY MEDICINE
Payer: MEDICAID

## 2023-08-31 ENCOUNTER — APPOINTMENT (OUTPATIENT)
Dept: PHYSICAL THERAPY | Facility: REHABILITATION | Age: 33
End: 2023-08-31
Attending: FAMILY MEDICINE
Payer: MEDICAID

## 2023-09-07 ENCOUNTER — APPOINTMENT (OUTPATIENT)
Dept: PHYSICAL THERAPY | Facility: REHABILITATION | Age: 33
End: 2023-09-07
Attending: FAMILY MEDICINE
Payer: MEDICAID

## 2023-09-14 ENCOUNTER — APPOINTMENT (OUTPATIENT)
Dept: PHYSICAL THERAPY | Facility: REHABILITATION | Age: 33
End: 2023-09-14
Attending: FAMILY MEDICINE
Payer: MEDICAID

## 2023-09-21 ENCOUNTER — APPOINTMENT (OUTPATIENT)
Dept: PHYSICAL THERAPY | Facility: REHABILITATION | Age: 33
End: 2023-09-21
Attending: FAMILY MEDICINE
Payer: MEDICAID

## (undated) DEVICE — HEAD HOLDER JUNIOR/ADULT

## (undated) DEVICE — ADHESIVE DERMABOND HVD MINI (12EA/BX)

## (undated) DEVICE — CATHETER IV NON-SAFETY 18 GA X 1 1/4 (50/BX 4BX/CA)

## (undated) DEVICE — SUTURE 1 CHROMIC CTX ETHICON - (36PK/BX)

## (undated) DEVICE — TRAY SPINAL ANESTHESIA NON-SAFETY (10/CA)

## (undated) DEVICE — TUBING CLEARLINK DUO-VENT - C-FLO (48EA/CA)

## (undated) DEVICE — PACK C-SECTION (2EA/CA)

## (undated) DEVICE — ELECTRODE DUAL RETURN W/ CORD - (50/PK)

## (undated) DEVICE — SUTURE 1 VICRYL PLUSCT-1 27IN - (36/BX)

## (undated) DEVICE — STAPLER SKIN DISP - (6/BX 10BX/CA) VISISTAT

## (undated) DEVICE — SUTURE 2/0 GUT-PLAIN (36PK/BX)

## (undated) DEVICE — SPONGE RADIOPAQUE CTN X-LG - STERILE (50PK/CA) MADE TO ORDER ITEM AND HAS A 4-6 WEEK LEAD TIME

## (undated) DEVICE — WATER IRRIGATION STERILE 1000ML (12EA/CA)

## (undated) DEVICE — KIT  I.V. START (100EA/CA)

## (undated) DEVICE — SODIUM CHL IRRIGATION 0.9% 1000ML (12EA/CA)

## (undated) DEVICE — LIGASURE SM JAW SEALER CRVD - (6EA/CA)

## (undated) DEVICE — SET EXTENSION WITH 2 PORTS (48EA/CA) ***PART #2C8610 IS A SUBSTITUTE*****

## (undated) DEVICE — GLOVE BIOGEL SZ 6 PF LATEX - (50EA/BX 4BX/CA)